# Patient Record
Sex: FEMALE | Race: BLACK OR AFRICAN AMERICAN | NOT HISPANIC OR LATINO
[De-identification: names, ages, dates, MRNs, and addresses within clinical notes are randomized per-mention and may not be internally consistent; named-entity substitution may affect disease eponyms.]

---

## 2017-05-15 ENCOUNTER — APPOINTMENT (OUTPATIENT)
Dept: HOME HEALTH SERVICES | Facility: HOME HEALTH | Age: 82
End: 2017-05-15

## 2017-05-19 ENCOUNTER — APPOINTMENT (OUTPATIENT)
Dept: HOME HEALTH SERVICES | Facility: HOME HEALTH | Age: 82
End: 2017-05-19

## 2017-05-19 VITALS
SYSTOLIC BLOOD PRESSURE: 118 MMHG | DIASTOLIC BLOOD PRESSURE: 60 MMHG | HEART RATE: 60 BPM | TEMPERATURE: 97 F | OXYGEN SATURATION: 98 % | RESPIRATION RATE: 20 BRPM

## 2017-05-19 DIAGNOSIS — Z74.01 BED CONFINEMENT STATUS: ICD-10-CM

## 2017-05-19 DIAGNOSIS — Z78.9 OTHER SPECIFIED HEALTH STATUS: ICD-10-CM

## 2017-06-30 ENCOUNTER — APPOINTMENT (OUTPATIENT)
Dept: HOME HEALTH SERVICES | Facility: HOME HEALTH | Age: 82
End: 2017-06-30

## 2017-08-10 ENCOUNTER — OTHER (OUTPATIENT)
Age: 82
End: 2017-08-10

## 2017-08-23 ENCOUNTER — APPOINTMENT (OUTPATIENT)
Dept: HOME HEALTH SERVICES | Facility: HOME HEALTH | Age: 82
End: 2017-08-23
Payer: OTHER GOVERNMENT

## 2017-08-23 VITALS
OXYGEN SATURATION: 97 % | HEART RATE: 64 BPM | DIASTOLIC BLOOD PRESSURE: 60 MMHG | TEMPERATURE: 96.5 F | SYSTOLIC BLOOD PRESSURE: 110 MMHG | RESPIRATION RATE: 20 BRPM

## 2017-08-23 PROCEDURE — 99350 HOME/RES VST EST HIGH MDM 60: CPT | Mod: 25

## 2017-08-23 PROCEDURE — 99497 ADVNCD CARE PLAN 30 MIN: CPT

## 2017-08-24 ENCOUNTER — LABORATORY RESULT (OUTPATIENT)
Age: 82
End: 2017-08-24

## 2017-08-28 LAB
25(OH)D3 SERPL-MCNC: 29.3 NG/ML
BASOPHILS # BLD AUTO: 0.06 K/UL
BASOPHILS NFR BLD AUTO: 0.9 %
CHOLEST SERPL-MCNC: 127 MG/DL
CHOLEST/HDLC SERPL: 3.2 RATIO
EOSINOPHIL # BLD AUTO: 0.24 K/UL
EOSINOPHIL NFR BLD AUTO: 3.6 %
HBA1C MFR BLD HPLC: 5.6 %
HCT VFR BLD CALC: 42.6 %
HDLC SERPL-MCNC: 40 MG/DL
HGB BLD-MCNC: 13.4 G/DL
LDLC SERPL CALC-MCNC: 60 MG/DL
LYMPHOCYTES # BLD AUTO: 1.74 K/UL
LYMPHOCYTES NFR BLD AUTO: 26.4 %
MAN DIFF?: NORMAL
MCHC RBC-ENTMCNC: 31.5 GM/DL
MCHC RBC-ENTMCNC: 33.3 PG
MCV RBC AUTO: 105.7 FL
MONOCYTES # BLD AUTO: 0.9 K/UL
MONOCYTES NFR BLD AUTO: 13.6 %
NEUTROPHILS # BLD AUTO: 3.66 K/UL
NEUTROPHILS NFR BLD AUTO: 55.5 %
PLATELET # BLD AUTO: 136 K/UL
RBC # BLD: 4.03 M/UL
RBC # FLD: 13.8 %
TRIGL SERPL-MCNC: 134 MG/DL
TSH SERPL-ACNC: 3.85 UIU/ML
VALPROATE SERPL-MCNC: 48 UG/ML
VIT B12 SERPL-MCNC: 657 PG/ML
WBC # FLD AUTO: 6.6 K/UL

## 2017-09-13 ENCOUNTER — APPOINTMENT (OUTPATIENT)
Dept: HOME HEALTH SERVICES | Facility: HOME HEALTH | Age: 82
End: 2017-09-13

## 2017-09-13 VITALS
TEMPERATURE: 97.2 F | SYSTOLIC BLOOD PRESSURE: 126 MMHG | RESPIRATION RATE: 18 BRPM | HEART RATE: 68 BPM | OXYGEN SATURATION: 96 % | DIASTOLIC BLOOD PRESSURE: 72 MMHG

## 2017-11-22 ENCOUNTER — CLINICAL ADVICE (OUTPATIENT)
Age: 82
End: 2017-11-22

## 2017-11-30 ENCOUNTER — CLINICAL ADVICE (OUTPATIENT)
Age: 82
End: 2017-11-30

## 2017-12-21 ENCOUNTER — APPOINTMENT (OUTPATIENT)
Dept: HOME HEALTH SERVICES | Facility: HOME HEALTH | Age: 82
End: 2017-12-21
Payer: OTHER GOVERNMENT

## 2017-12-21 VITALS
TEMPERATURE: 97.5 F | HEART RATE: 66 BPM | DIASTOLIC BLOOD PRESSURE: 68 MMHG | OXYGEN SATURATION: 97 % | SYSTOLIC BLOOD PRESSURE: 122 MMHG | RESPIRATION RATE: 18 BRPM

## 2017-12-21 PROCEDURE — 99350 HOME/RES VST EST HIGH MDM 60: CPT | Mod: 25

## 2017-12-21 PROCEDURE — 43760Z: CUSTOM

## 2018-01-19 ENCOUNTER — MEDICATION RENEWAL (OUTPATIENT)
Age: 83
End: 2018-01-19

## 2018-01-29 ENCOUNTER — RX RENEWAL (OUTPATIENT)
Age: 83
End: 2018-01-29

## 2018-02-26 ENCOUNTER — APPOINTMENT (OUTPATIENT)
Dept: HOME HEALTH SERVICES | Facility: HOME HEALTH | Age: 83
End: 2018-02-26
Payer: MEDICAID

## 2018-02-26 VITALS
SYSTOLIC BLOOD PRESSURE: 118 MMHG | HEART RATE: 62 BPM | OXYGEN SATURATION: 93 % | RESPIRATION RATE: 18 BRPM | TEMPERATURE: 97 F | DIASTOLIC BLOOD PRESSURE: 60 MMHG

## 2018-02-26 PROCEDURE — 99349 HOME/RES VST EST MOD MDM 40: CPT

## 2018-02-28 ENCOUNTER — LABORATORY RESULT (OUTPATIENT)
Age: 83
End: 2018-02-28

## 2018-02-28 LAB
ALBUMIN SERPL ELPH-MCNC: 3 G/DL
ALP BLD-CCNC: 61 U/L
ALT SERPL-CCNC: 12 U/L
ANION GAP SERPL CALC-SCNC: 10 MMOL/L
AST SERPL-CCNC: 16 U/L
BASOPHILS # BLD AUTO: 0.01 K/UL
BASOPHILS NFR BLD AUTO: 0.1 %
BILIRUB SERPL-MCNC: 0.4 MG/DL
BUN SERPL-MCNC: 18 MG/DL
CALCIUM SERPL-MCNC: 10.5 MG/DL
CHLORIDE SERPL-SCNC: 103 MMOL/L
CHOLEST SERPL-MCNC: 131 MG/DL
CHOLEST/HDLC SERPL: 3.5 RATIO
CO2 SERPL-SCNC: 25 MMOL/L
CREAT SERPL-MCNC: 0.5 MG/DL
EOSINOPHIL # BLD AUTO: 0.36 K/UL
EOSINOPHIL NFR BLD AUTO: 4.9 %
GLUCOSE SERPL-MCNC: 128 MG/DL
HCT VFR BLD CALC: 41.3 %
HDLC SERPL-MCNC: 37 MG/DL
HGB BLD-MCNC: 12.9 G/DL
IMM GRANULOCYTES NFR BLD AUTO: 0.3 %
LDLC SERPL CALC-MCNC: 58 MG/DL
LYMPHOCYTES # BLD AUTO: 2.23 K/UL
LYMPHOCYTES NFR BLD AUTO: 30.3 %
MAN DIFF?: NORMAL
MCHC RBC-ENTMCNC: 31.2 GM/DL
MCHC RBC-ENTMCNC: 33.3 PG
MCV RBC AUTO: 106.7 FL
MONOCYTES # BLD AUTO: 0.8 K/UL
MONOCYTES NFR BLD AUTO: 10.9 %
NEUTROPHILS # BLD AUTO: 3.95 K/UL
NEUTROPHILS NFR BLD AUTO: 53.5 %
PLATELET # BLD AUTO: 133 K/UL
POTASSIUM SERPL-SCNC: 3.8 MMOL/L
PROT SERPL-MCNC: 6.7 G/DL
RBC # BLD: 3.87 M/UL
RBC # FLD: 13.3 %
SODIUM SERPL-SCNC: 138 MMOL/L
TRIGL SERPL-MCNC: 180 MG/DL
WBC # FLD AUTO: 7.37 K/UL

## 2018-03-02 LAB
25(OH)D3 SERPL-MCNC: 24.5 NG/ML
FOLATE SERPL-MCNC: 15.4 NG/ML
HBA1C MFR BLD HPLC: 5.5 %
TSH SERPL-ACNC: 4.76 UIU/ML
VALPROATE SERPL-MCNC: 69 UG/ML
VIT B12 SERPL-MCNC: 795 PG/ML

## 2018-03-26 ENCOUNTER — APPOINTMENT (OUTPATIENT)
Dept: HOME HEALTH SERVICES | Facility: HOME HEALTH | Age: 83
End: 2018-03-26

## 2018-03-26 VITALS
HEART RATE: 68 BPM | SYSTOLIC BLOOD PRESSURE: 114 MMHG | TEMPERATURE: 97.6 F | RESPIRATION RATE: 18 BRPM | DIASTOLIC BLOOD PRESSURE: 62 MMHG | OXYGEN SATURATION: 94 %

## 2018-04-30 ENCOUNTER — APPOINTMENT (OUTPATIENT)
Dept: HOME HEALTH SERVICES | Facility: HOME HEALTH | Age: 83
End: 2018-04-30
Payer: OTHER GOVERNMENT

## 2018-04-30 VITALS
SYSTOLIC BLOOD PRESSURE: 118 MMHG | RESPIRATION RATE: 18 BRPM | HEART RATE: 72 BPM | TEMPERATURE: 97.5 F | DIASTOLIC BLOOD PRESSURE: 70 MMHG | OXYGEN SATURATION: 96 %

## 2018-04-30 PROCEDURE — 43760Z: CUSTOM

## 2018-04-30 PROCEDURE — 99350 HOME/RES VST EST HIGH MDM 60: CPT | Mod: 25

## 2018-04-30 PROCEDURE — 99497 ADVNCD CARE PLAN 30 MIN: CPT

## 2018-06-02 ENCOUNTER — RX RENEWAL (OUTPATIENT)
Age: 83
End: 2018-06-02

## 2018-06-04 ENCOUNTER — APPOINTMENT (OUTPATIENT)
Dept: HOME HEALTH SERVICES | Facility: HOME HEALTH | Age: 83
End: 2018-06-04
Payer: OTHER GOVERNMENT

## 2018-06-04 VITALS
OXYGEN SATURATION: 96 % | SYSTOLIC BLOOD PRESSURE: 122 MMHG | RESPIRATION RATE: 16 BRPM | HEART RATE: 78 BPM | DIASTOLIC BLOOD PRESSURE: 68 MMHG | TEMPERATURE: 97.5 F

## 2018-06-04 PROCEDURE — 99349 HOME/RES VST EST MOD MDM 40: CPT

## 2018-06-19 ENCOUNTER — APPOINTMENT (OUTPATIENT)
Dept: HOME HEALTH SERVICES | Facility: HOME HEALTH | Age: 83
End: 2018-06-19

## 2018-06-20 VITALS
RESPIRATION RATE: 18 BRPM | TEMPERATURE: 98.2 F | DIASTOLIC BLOOD PRESSURE: 68 MMHG | OXYGEN SATURATION: 96 % | SYSTOLIC BLOOD PRESSURE: 118 MMHG | HEART RATE: 76 BPM

## 2018-08-02 ENCOUNTER — RX RENEWAL (OUTPATIENT)
Age: 83
End: 2018-08-02

## 2018-08-14 ENCOUNTER — APPOINTMENT (OUTPATIENT)
Dept: HOME HEALTH SERVICES | Facility: HOME HEALTH | Age: 83
End: 2018-08-14
Payer: COMMERCIAL

## 2018-08-14 VITALS
OXYGEN SATURATION: 97 % | HEART RATE: 72 BPM | RESPIRATION RATE: 18 BRPM | DIASTOLIC BLOOD PRESSURE: 60 MMHG | TEMPERATURE: 97.9 F | SYSTOLIC BLOOD PRESSURE: 110 MMHG

## 2018-08-14 DIAGNOSIS — L89.322 PRESSURE ULCER OF LEFT BUTTOCK, STAGE 2: ICD-10-CM

## 2018-08-14 PROCEDURE — 99350 HOME/RES VST EST HIGH MDM 60: CPT

## 2018-08-20 ENCOUNTER — FORM ENCOUNTER (OUTPATIENT)
Age: 83
End: 2018-08-20

## 2018-08-22 ENCOUNTER — APPOINTMENT (OUTPATIENT)
Dept: HOME HEALTH SERVICES | Facility: HOME HEALTH | Age: 83
End: 2018-08-22

## 2018-08-22 VITALS
HEART RATE: 76 BPM | OXYGEN SATURATION: 96 % | SYSTOLIC BLOOD PRESSURE: 112 MMHG | TEMPERATURE: 98.2 F | RESPIRATION RATE: 18 BRPM | DIASTOLIC BLOOD PRESSURE: 64 MMHG

## 2018-09-07 ENCOUNTER — APPOINTMENT (OUTPATIENT)
Dept: HOME HEALTH SERVICES | Facility: HOME HEALTH | Age: 83
End: 2018-09-07

## 2018-09-07 VITALS
RESPIRATION RATE: 18 BRPM | SYSTOLIC BLOOD PRESSURE: 116 MMHG | HEART RATE: 78 BPM | OXYGEN SATURATION: 98 % | DIASTOLIC BLOOD PRESSURE: 68 MMHG | TEMPERATURE: 98.8 F

## 2018-10-02 ENCOUNTER — APPOINTMENT (OUTPATIENT)
Dept: HOME HEALTH SERVICES | Facility: HOME HEALTH | Age: 83
End: 2018-10-02
Payer: COMMERCIAL

## 2018-10-02 VITALS
TEMPERATURE: 97.9 F | RESPIRATION RATE: 16 BRPM | OXYGEN SATURATION: 98 % | SYSTOLIC BLOOD PRESSURE: 118 MMHG | HEART RATE: 87 BPM | DIASTOLIC BLOOD PRESSURE: 60 MMHG

## 2018-10-02 DIAGNOSIS — L89.323 PRESSURE ULCER OF LEFT BUTTOCK, STAGE 3: ICD-10-CM

## 2018-10-02 PROCEDURE — 99350 HOME/RES VST EST HIGH MDM 60: CPT | Mod: 25

## 2018-10-02 PROCEDURE — 43760Z: CUSTOM

## 2018-11-02 ENCOUNTER — CLINICAL ADVICE (OUTPATIENT)
Age: 83
End: 2018-11-02

## 2018-11-02 ENCOUNTER — APPOINTMENT (OUTPATIENT)
Dept: HOME HEALTH SERVICES | Facility: HOME HEALTH | Age: 83
End: 2018-11-02

## 2018-11-02 VITALS
SYSTOLIC BLOOD PRESSURE: 126 MMHG | DIASTOLIC BLOOD PRESSURE: 78 MMHG | TEMPERATURE: 98.2 F | HEART RATE: 86 BPM | RESPIRATION RATE: 16 BRPM | OXYGEN SATURATION: 98 %

## 2018-11-02 RX ORDER — CEPHALEXIN 250 MG/5ML
250 FOR SUSPENSION ORAL
Qty: 1 | Refills: 0 | Status: DISCONTINUED | COMMUNITY
Start: 2018-09-04 | End: 2018-11-02

## 2018-11-28 ENCOUNTER — APPOINTMENT (OUTPATIENT)
Dept: HOME HEALTH SERVICES | Facility: HOME HEALTH | Age: 83
End: 2018-11-28
Payer: COMMERCIAL

## 2018-11-28 VITALS
TEMPERATURE: 97.7 F | DIASTOLIC BLOOD PRESSURE: 85 MMHG | RESPIRATION RATE: 14 BRPM | OXYGEN SATURATION: 97 % | HEART RATE: 65 BPM | SYSTOLIC BLOOD PRESSURE: 142 MMHG

## 2018-11-28 DIAGNOSIS — R06.82 TACHYPNEA, NOT ELSEWHERE CLASSIFIED: ICD-10-CM

## 2018-11-28 PROCEDURE — 99349 HOME/RES VST EST MOD MDM 40: CPT

## 2019-01-01 ENCOUNTER — APPOINTMENT (OUTPATIENT)
Dept: HOME HEALTH SERVICES | Facility: HOME HEALTH | Age: 84
End: 2019-01-01

## 2019-01-01 ENCOUNTER — TRANSCRIPTION ENCOUNTER (OUTPATIENT)
Age: 84
End: 2019-01-01

## 2019-01-01 ENCOUNTER — APPOINTMENT (OUTPATIENT)
Dept: HOME HEALTH SERVICES | Facility: HOME HEALTH | Age: 84
End: 2019-01-01
Payer: COMMERCIAL

## 2019-01-01 VITALS
TEMPERATURE: 97.4 F | OXYGEN SATURATION: 97 % | DIASTOLIC BLOOD PRESSURE: 80 MMHG | SYSTOLIC BLOOD PRESSURE: 110 MMHG | HEART RATE: 59 BPM | RESPIRATION RATE: 16 BRPM

## 2019-01-01 VITALS
SYSTOLIC BLOOD PRESSURE: 116 MMHG | RESPIRATION RATE: 16 BRPM | HEART RATE: 64 BPM | DIASTOLIC BLOOD PRESSURE: 62 MMHG | TEMPERATURE: 96.8 F | OXYGEN SATURATION: 98 %

## 2019-01-01 DIAGNOSIS — J84.9 INTERSTITIAL PULMONARY DISEASE, UNSPECIFIED: ICD-10-CM

## 2019-01-01 PROCEDURE — 99349 HOME/RES VST EST MOD MDM 40: CPT | Mod: 25

## 2019-01-01 PROCEDURE — 43762Z: CUSTOM

## 2019-01-01 RX ORDER — SODIUM HYPOCHLORITE 2.5 MG/ML
0.25 SOLUTION TOPICAL
Qty: 1 | Refills: 0 | Status: DISCONTINUED | COMMUNITY
Start: 2019-09-10 | End: 2019-01-01

## 2019-01-02 ENCOUNTER — APPOINTMENT (OUTPATIENT)
Dept: HOME HEALTH SERVICES | Facility: HOME HEALTH | Age: 84
End: 2019-01-02

## 2019-01-02 ENCOUNTER — CLINICAL ADVICE (OUTPATIENT)
Age: 84
End: 2019-01-02

## 2019-01-02 VITALS
TEMPERATURE: 98.2 F | SYSTOLIC BLOOD PRESSURE: 136 MMHG | RESPIRATION RATE: 16 BRPM | OXYGEN SATURATION: 96 % | DIASTOLIC BLOOD PRESSURE: 82 MMHG | HEART RATE: 62 BPM

## 2019-01-30 ENCOUNTER — APPOINTMENT (OUTPATIENT)
Dept: HOME HEALTH SERVICES | Facility: HOME HEALTH | Age: 84
End: 2019-01-30
Payer: OTHER GOVERNMENT

## 2019-01-30 VITALS
OXYGEN SATURATION: 96 % | TEMPERATURE: 97.1 F | HEART RATE: 67 BPM | RESPIRATION RATE: 14 BRPM | SYSTOLIC BLOOD PRESSURE: 116 MMHG | DIASTOLIC BLOOD PRESSURE: 58 MMHG

## 2019-01-30 DIAGNOSIS — K11.7 DISTURBANCES OF SALIVARY SECRETION: ICD-10-CM

## 2019-01-30 DIAGNOSIS — K59.00 CONSTIPATION, UNSPECIFIED: ICD-10-CM

## 2019-01-30 PROCEDURE — 99350 HOME/RES VST EST HIGH MDM 60: CPT

## 2019-01-30 NOTE — PHYSICAL EXAM
[No Acute Distress] : no acute distress [Well Nourished] : well nourished [Normal Sclera/Conjunctiva] : normal sclera/conjunctiva [Normal Outer Ear/Nose] : the ears and nose were normal in appearance [Normal Oropharynx] : the oropharynx was normal [No JVD] : no jugular venous distention [Supple] : the neck was supple [No LAD] : no lymphadenopathy [No Respiratory Distress] : no respiratory distress [No Accessory Muscle Use] : no accessory muscle use [Normal Rate] : heart rate was normal  [Regular Rhythm] : with a regular rhythm [Normal Appearance] : normal in appearance [No Masses] : no palpable masses [Non Tender] : non-tender [Normal Inguinal Nodes] : no inguinal lymphadenopathy [Normal Post Cervical Nodes] : no posterior cervical lymphadenopathy [Normal Anterior Cervical Nodes] : no anterior cervical lymphadenopathy [No CVA Tenderness] : no ~M costovertebral angle tenderness [No Rash] : no rash [No Skin Lesions] : no skin lesions [de-identified] : frail appearing female, laying in bed with eyes closed [de-identified] : harsh breath sounds, slightly tachypneic [de-identified] : Peg 20Fr 7-10cc  in place [de-identified] : bedbound, upper and lower extremity contractures [de-identified] : Stage 3 decubitus ulcer of right buttock 8 cm x 5 cm.  [de-identified] : cannot obtain [de-identified] : non verbal, demented

## 2019-01-30 NOTE — REASON FOR VISIT
[Follow-Up] : a follow-up visit [Formal Caregiver] : formal caregiver [Pre-Visit Preparation] : pre-visit preparation was done [FreeTextEntry1] : Hypertension, Parkinson's Disease, Parkinson's Dementia, Glaucoma, PEG replacement

## 2019-01-30 NOTE — HISTORY OF PRESENT ILLNESS
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 Patient is non verbal, bed bound, G-tube feeding 20 Fr 10 cc G-tube, continuous feeds for 14 hours. She is on. 500 cc Nutren mixed with 500 cc of water. Free water 10 ounces twice daily with medications. Bowels are regular with occasional use of miralax. Sleeping well, no agitation. Remains non-verbal. Still getting home care for sacral wound 3 times weekly. Switching to medihoney and aquacel silver. \par \par Patient is completely dependent and does not move in bed. She is contracted in her upper and lower extremities. \par \par Family aware patient is poor prognosis. Spoke with son, Harish, during the visit.

## 2019-01-30 NOTE — COUNSELING
[Non - Smoker] : non-smoker [Medical alert] : medical alert [Vaccinations: _______] : Vaccinations: [unfilled] [Decrease hospital use] : decrease hospital use [Minimize unnecessary interventions] : minimize unnecessary interventions [Discussed disease trajectory with patient/caregiver] : discussed disease trajectory with patient/caregiver [Completed Medical Orders for Life-Sustaining Treatment] : completed medical orders for life-sustaining treatment [DNR] : Code Status: DNR [Limited] : Treatment Guidelines: Limited [Trial of Intubation] : Intubation: Trial of Intubation [[facility]: [facility]last verification date: [unfilled] [_____] : HCP: [unfilled] [FreeTextEntry3] : PEG feeding [FreeTextEntry2] : Call  for all medical concerns

## 2019-03-05 ENCOUNTER — APPOINTMENT (OUTPATIENT)
Dept: HOME HEALTH SERVICES | Facility: HOME HEALTH | Age: 84
End: 2019-03-05

## 2019-03-28 ENCOUNTER — APPOINTMENT (OUTPATIENT)
Dept: HOME HEALTH SERVICES | Facility: HOME HEALTH | Age: 84
End: 2019-03-28
Payer: OTHER GOVERNMENT

## 2019-03-28 VITALS
OXYGEN SATURATION: 99 % | DIASTOLIC BLOOD PRESSURE: 80 MMHG | TEMPERATURE: 97.7 F | SYSTOLIC BLOOD PRESSURE: 130 MMHG | HEART RATE: 76 BPM | RESPIRATION RATE: 14 BRPM

## 2019-03-28 DIAGNOSIS — L89.324 PRESSURE ULCER OF LEFT BUTTOCK, STAGE 4: ICD-10-CM

## 2019-03-28 DIAGNOSIS — K21.9 GASTRO-ESOPHAGEAL REFLUX DISEASE W/OUT ESOPHAGITIS: ICD-10-CM

## 2019-03-28 DIAGNOSIS — H40.9 UNSPECIFIED GLAUCOMA: ICD-10-CM

## 2019-03-28 DIAGNOSIS — Z71.89 OTHER SPECIFIED COUNSELING: ICD-10-CM

## 2019-03-28 PROCEDURE — 99350 HOME/RES VST EST HIGH MDM 60: CPT | Mod: 25

## 2019-03-28 PROCEDURE — 99497 ADVNCD CARE PLAN 30 MIN: CPT

## 2019-03-29 ENCOUNTER — APPOINTMENT (OUTPATIENT)
Dept: HOME HEALTH SERVICES | Facility: HOME HEALTH | Age: 84
End: 2019-03-29
Payer: OTHER GOVERNMENT

## 2019-03-29 PROBLEM — L89.324: Noted: 2018-10-02

## 2019-03-29 PROCEDURE — 99347 HOME/RES VST EST SF MDM 20: CPT | Mod: 25

## 2019-03-29 PROCEDURE — 43762Z: CUSTOM

## 2019-03-30 PROBLEM — Z71.89 ADVANCE CARE PLANNING: Status: ACTIVE | Noted: 2017-05-19

## 2019-03-30 PROBLEM — K21.9 GASTROESOPHAGEAL REFLUX DISEASE WITHOUT ESOPHAGITIS: Status: ACTIVE | Noted: 2017-05-19

## 2019-03-30 PROBLEM — H40.9 GLAUCOMA OF BOTH EYES: Status: ACTIVE | Noted: 2017-05-19

## 2019-03-30 NOTE — COUNSELING
[Non - Smoker] : non-smoker [Medical alert] : medical alert [Decrease hospital use] : decrease hospital use [Minimize unnecessary interventions] : minimize unnecessary interventions [Discussed disease trajectory with patient/caregiver] : discussed disease trajectory with patient/caregiver [Completed Medical Orders for Life-Sustaining Treatment] : completed medical orders for life-sustaining treatment [DNR] : Code Status: DNR [Limited] : Treatment Guidelines: Limited [Trial of Intubation] : Intubation: Trial of Intubation [_____] : HCP: [unfilled] [Not Indicated] : not indicated [FreeTextEntry3] : PEG feeding [FreeTextEntry2] : Call  for all medical concerns [Annual Discussion and review of: ___] : Annual discussion and review of [unfilled] [Completed DNR] : completed DNR [[facility]: _____] [facility] Completion/last verification date: [unfilled]

## 2019-03-30 NOTE — HISTORY OF PRESENT ILLNESS
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 for 14 hours with free water 10 ounces twice daily with medications.\par Moving bowels well with occasional use of miralax.\par Sleeping well through night\par Remains on  home care for (L) hip wound 3 times weekly currently treated with medihoney with   foam dressing\par Family aware patient is poor prognosis. Spoke with son, Harish, during the visit.

## 2019-03-30 NOTE — REASON FOR VISIT
[Acute] : an acute visit [Formal Caregiver] : formal caregiver [FreeTextEntry1] : for PEG tube change

## 2019-03-30 NOTE — REVIEW OF SYSTEMS
[Fever] : no fever [Chills] : no chills [Fatigue] : fatigue [Cough] : cough [Constipation] : no constipation [Incontinence] : incontinence [Negative] : Cardiovascular [FreeTextEntry7] : PEG tube stretched [de-identified] : as noted in HPI [FreeTextEntry1] : GHAZALA w/ son & aide

## 2019-03-30 NOTE — REASON FOR VISIT
[Follow-Up] : a follow-up visit [Formal Caregiver] : formal caregiver [Pre-Visit Preparation] : pre-visit preparation was done [FreeTextEntry1] : for chronic conditions

## 2019-03-30 NOTE — REVIEW OF SYSTEMS
[Fatigue] : fatigue [Cough] : cough [Incontinence] : incontinence [Negative] : Cardiovascular [Fever] : no fever [Chills] : no chills [Constipation] : no constipation [FreeTextEntry7] : PEG tube stretched [de-identified] : as noted in HPI [FreeTextEntry1] : GHAZALA w/ son & aide

## 2019-03-30 NOTE — PHYSICAL EXAM
[No Acute Distress] : no acute distress [Well Nourished] : well nourished [Normal Outer Ear/Nose] : the ears and nose were normal in appearance [Normal Oropharynx] : the oropharynx was normal [No JVD] : no jugular venous distention [Supple] : the neck was supple [No LAD] : no lymphadenopathy [No Respiratory Distress] : no respiratory distress [No Accessory Muscle Use] : no accessory muscle use [Normal Rate] : heart rate was normal  [Regular Rhythm] : with a regular rhythm [Normal Appearance] : normal in appearance [No Masses] : no palpable masses [Non Tender] : non-tender [Normal Post Cervical Nodes] : no posterior cervical lymphadenopathy [Normal Anterior Cervical Nodes] : no anterior cervical lymphadenopathy [No CVA Tenderness] : no ~M costovertebral angle tenderness [No Rash] : no rash [No Skin Lesions] : no skin lesions [Normal Sclera/Conjunctiva] : normal sclera/conjunctiva [Normal Bowel Sounds] : normal bowel sounds [Soft] : abdomen soft [Not Distended] : not distended [de-identified] : frail appearing female, contracted arms and hands; unable to make needs know [de-identified] : scattered rhonchi [de-identified] : stretched peg 20Fr 7-10cc  in place [de-identified] : bedbound, upper and lower extremity contractures [de-identified] : Stage 3 decubitus ulcer of (L) buttock with dressing in place [de-identified] : cannot obtain [de-identified] : non verbal, demented

## 2019-03-30 NOTE — PHYSICAL EXAM
[No Acute Distress] : no acute distress [Well Nourished] : well nourished [Normal Outer Ear/Nose] : the ears and nose were normal in appearance [No JVD] : no jugular venous distention [No LAD] : no lymphadenopathy [No Respiratory Distress] : no respiratory distress [No Accessory Muscle Use] : no accessory muscle use [Non Tender] : non-tender [Normal Anterior Cervical Nodes] : no anterior cervical lymphadenopathy [No CVA Tenderness] : no ~M costovertebral angle tenderness [No Rash] : no rash [No Skin Lesions] : no skin lesions [No Edema] : there was no peripheral edema [Normal Bowel Sounds] : normal bowel sounds [Soft] : abdomen soft [Not Distended] : not distended [de-identified] : frail appearing female, contracted arms and hands; unable to make needs know [de-identified] : stretched peg 20Fr 7-10cc  in place [de-identified] : bedbound, upper and lower extremity contractures [de-identified] : cannot obtain [de-identified] : non verbal, demented

## 2019-03-30 NOTE — HISTORY OF PRESENT ILLNESS
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 for 14 hours with free water 10 ounces twice daily with medications.\par

## 2019-05-15 ENCOUNTER — RX RENEWAL (OUTPATIENT)
Age: 84
End: 2019-05-15

## 2019-05-23 ENCOUNTER — RX RENEWAL (OUTPATIENT)
Age: 84
End: 2019-05-23

## 2019-05-28 ENCOUNTER — APPOINTMENT (OUTPATIENT)
Dept: HOME HEALTH SERVICES | Facility: HOME HEALTH | Age: 84
End: 2019-05-28
Payer: OTHER GOVERNMENT

## 2019-05-28 VITALS
DIASTOLIC BLOOD PRESSURE: 78 MMHG | TEMPERATURE: 96.1 F | SYSTOLIC BLOOD PRESSURE: 122 MMHG | OXYGEN SATURATION: 98 % | RESPIRATION RATE: 14 BRPM | HEART RATE: 60 BPM

## 2019-05-28 PROCEDURE — 99349 HOME/RES VST EST MOD MDM 40: CPT

## 2019-05-28 RX ORDER — COLLAGENASE SANTYL 250 [ARB'U]/G
250 OINTMENT TOPICAL DAILY
Qty: 1 | Refills: 3 | Status: DISCONTINUED | COMMUNITY
Start: 2018-07-18 | End: 2019-05-28

## 2019-05-28 NOTE — REASON FOR VISIT
[Follow-Up] : a follow-up visit [Pre-Visit Preparation] : pre-visit preparation was done [Formal Caregiver] : formal caregiver [FreeTextEntry1] : for chronic conditions

## 2019-05-28 NOTE — COUNSELING
[Non - Smoker] : non-smoker [Medical alert] : medical alert [Not Indicated] : not indicated [Minimize unnecessary interventions] : minimize unnecessary interventions [Decrease hospital use] : decrease hospital use [Discussed disease trajectory with patient/caregiver] : discussed disease trajectory with patient/caregiver [Annual Discussion and review of: ___] : Annual discussion and review of [unfilled] [Completed DNR] : completed DNR [DNR] : Code Status: DNR [Completed Medical Orders for Life-Sustaining Treatment] : completed medical orders for life-sustaining treatment [Limited] : Treatment Guidelines: Limited [Trial of Intubation] : Intubation: Trial of Intubation [[facility]: _____] [facility]/last verification date: [unfilled] [_____] : HCP: [unfilled] [FreeTextEntry2] : Call  for all medical concerns [FreeTextEntry3] : PEG feeding

## 2019-05-28 NOTE — REVIEW OF SYSTEMS
[Fatigue] : fatigue [Cough] : cough [Incontinence] : incontinence [Negative] : Cardiovascular [Fever] : no fever [Chills] : no chills [Constipation] : no constipation [FreeTextEntry7] : PEG tube stretched [de-identified] : as noted in HPI [FreeTextEntry1] : GHAZALA w/ son & aide

## 2019-05-28 NOTE — PHYSICAL EXAM
[No Acute Distress] : no acute distress [Normal Sclera/Conjunctiva] : normal sclera/conjunctiva [Normal Outer Ear/Nose] : the ears and nose were normal in appearance [Well Nourished] : well nourished [Normal Oropharynx] : the oropharynx was normal [No JVD] : no jugular venous distention [No LAD] : no lymphadenopathy [No Respiratory Distress] : no respiratory distress [Supple] : the neck was supple [No Accessory Muscle Use] : no accessory muscle use [Normal Rate] : heart rate was normal  [Regular Rhythm] : with a regular rhythm [Normal Appearance] : normal in appearance [No Masses] : no palpable masses [Non Tender] : non-tender [Normal Bowel Sounds] : normal bowel sounds [Soft] : abdomen soft [Normal Anterior Cervical Nodes] : no anterior cervical lymphadenopathy [Not Distended] : not distended [Normal Post Cervical Nodes] : no posterior cervical lymphadenopathy [No Skin Lesions] : no skin lesions [No CVA Tenderness] : no ~M costovertebral angle tenderness [No Rash] : no rash [de-identified] : frail appearing female, contracted arms and hands; unable to make needs know [de-identified] : scattered rhonchi [de-identified] : stretched peg 20Fr 7-10cc  in place [de-identified] : bedbound, upper and lower extremity contractures [de-identified] : Stage 3 decubitus ulcer of (L) buttock with dressing in place [de-identified] : non verbal, demented [de-identified] : cannot obtain

## 2019-05-28 NOTE — HISTORY OF PRESENT ILLNESS
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 [FreeTextEntry2] : PMH:  Hypertension, Parkinson's Disease, Parkinson's Dementia, Glaucoma\par \par Pt in bed contracted, non- verbal unable to make needs know, dependent on all ADLs\par Aide in home reports pt in usual state of health; on G-Tube feeding with 500 cc Nutren mixed with 500 cc of water.for 14 hours with free water 10 ounces twice daily with medications.\par Still moving bowels well with occasional use of miralax.\par Sleeping well through night\par Remains on  home care for (L) hip wound 3 times weekly-  treated with medihoney with   foam dressing\par Family aware patient is poor prognosis. Spoke with son, Harish, during the visit.

## 2019-05-28 NOTE — LETTER HEADER
[Care Plan reviewed and provided to patient/caregiver] : Care plan reviewed and provided to patient/caregiver [Care Plan reviewed every ___ weeks] : Care plan reviewed every [unfilled] weeks [Initiation or substantial revisions made to care plan involving mod/high medical decision making for complex CCM] : Initiation or substantial revisions made to care plan involving mod/high medical decision making for complex CCM [Patient/Caregiver agrees to have other providers send summary of their care to this office] : Patient/caregiver agrees to have other providers send summary of their care to this office [Care Plan managed/Care coordinated by: ___] : Care plan managed/Care coordinated by: [unfilled]

## 2019-06-03 ENCOUNTER — MEDICATION RENEWAL (OUTPATIENT)
Age: 84
End: 2019-06-03

## 2019-06-19 ENCOUNTER — APPOINTMENT (OUTPATIENT)
Dept: HOME HEALTH SERVICES | Facility: HOME HEALTH | Age: 84
End: 2019-06-19

## 2019-06-19 VITALS
HEART RATE: 64 BPM | TEMPERATURE: 96.8 F | RESPIRATION RATE: 16 BRPM | OXYGEN SATURATION: 98 % | SYSTOLIC BLOOD PRESSURE: 126 MMHG | DIASTOLIC BLOOD PRESSURE: 72 MMHG

## 2019-06-24 ENCOUNTER — MEDICATION RENEWAL (OUTPATIENT)
Age: 84
End: 2019-06-24

## 2019-07-18 ENCOUNTER — LABORATORY RESULT (OUTPATIENT)
Age: 84
End: 2019-07-18

## 2019-07-18 ENCOUNTER — APPOINTMENT (OUTPATIENT)
Dept: HOME HEALTH SERVICES | Facility: HOME HEALTH | Age: 84
End: 2019-07-18
Payer: OTHER GOVERNMENT

## 2019-07-18 VITALS
DIASTOLIC BLOOD PRESSURE: 70 MMHG | TEMPERATURE: 97 F | RESPIRATION RATE: 14 BRPM | OXYGEN SATURATION: 99 % | SYSTOLIC BLOOD PRESSURE: 128 MMHG | HEART RATE: 60 BPM

## 2019-07-18 PROCEDURE — 99350 HOME/RES VST EST HIGH MDM 60: CPT

## 2019-07-19 LAB
ALBUMIN SERPL ELPH-MCNC: 3.2 G/DL
ALP BLD-CCNC: 64 U/L
ALT SERPL-CCNC: 5 U/L
ANION GAP SERPL CALC-SCNC: 10 MMOL/L
AST SERPL-CCNC: 12 U/L
BASOPHILS # BLD AUTO: 0.02 K/UL
BASOPHILS NFR BLD AUTO: 0.3 %
BILIRUB SERPL-MCNC: 0.3 MG/DL
BUN SERPL-MCNC: 17 MG/DL
CALCIUM SERPL-MCNC: 10.4 MG/DL
CHLORIDE SERPL-SCNC: 100 MMOL/L
CO2 SERPL-SCNC: 26 MMOL/L
CREAT SERPL-MCNC: 0.37 MG/DL
EOSINOPHIL # BLD AUTO: 0.25 K/UL
EOSINOPHIL NFR BLD AUTO: 4.2 %
HCT VFR BLD CALC: 41.1 %
HGB BLD-MCNC: 12.3 G/DL
IMM GRANULOCYTES NFR BLD AUTO: 0.2 %
LYMPHOCYTES # BLD AUTO: 1.88 K/UL
LYMPHOCYTES NFR BLD AUTO: 31.8 %
MAN DIFF?: NORMAL
MCHC RBC-ENTMCNC: 29.9 GM/DL
MCHC RBC-ENTMCNC: 31.9 PG
MCV RBC AUTO: 106.8 FL
MONOCYTES # BLD AUTO: 0.52 K/UL
MONOCYTES NFR BLD AUTO: 8.8 %
NEUTROPHILS # BLD AUTO: 3.24 K/UL
NEUTROPHILS NFR BLD AUTO: 54.7 %
PLATELET # BLD AUTO: 195 K/UL
POTASSIUM SERPL-SCNC: 4.3 MMOL/L
PREALB SERPL NEPH-MCNC: 18 MG/DL
PROT SERPL-MCNC: 7.8 G/DL
RBC # BLD: 3.85 M/UL
RBC # FLD: 13.6 %
SODIUM SERPL-SCNC: 136 MMOL/L
WBC # FLD AUTO: 5.92 K/UL

## 2019-07-19 NOTE — ASSESSMENT
[FreeTextEntry1] : Lana Escobar  has a stage _3 pressure ulcer with measurements of 0.8CM X 1CM X 1.8CM  TUNNEL AT 1 O'CLOCK 4\par Lana Escobar has been on regular assessment by a nurse and MD along with appropriate turning and positioning and management of moisture and incontinence for at least the past month which has included the use of a group 1 support surface which is less than 3.5 inches AND the wounds have worsened- will order a group 2 mattress\par

## 2019-07-19 NOTE — REVIEW OF SYSTEMS
[Fatigue] : fatigue [Cough] : cough [Incontinence] : incontinence [Negative] : Cardiovascular [Fever] : no fever [Chills] : no chills [Constipation] : no constipation [FreeTextEntry7] : PEG tube stretched [de-identified] : as noted in HPI [FreeTextEntry1] : GHAZALA w/ son & aide

## 2019-07-19 NOTE — HISTORY OF PRESENT ILLNESS
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 [FreeTextEntry2] : PMH:  Hypertension, Parkinson's Disease, Parkinson's Dementia, Glaucoma\par \par Pt in bed contracted, non- verbal unable to make needs know, dependent on all ADLs\par Aide reports pt in usual state of health; on G-Tube feedings with 500 cc Nutren mixed with 500 cc of water.for 14 hours with free water 10 ounces twice daily with medications- tube stretched again after being changed in March\par Moves bowels well with occasional MiraLAX.\par Sleeping well through night\par Remains on  home care for (L) hip wound 3 times weekly-  treated Hydrofera blue & cover with Tegaderm & reinforced with Medipore 2x/week\par Family aware patient is poor prognosis.

## 2019-07-19 NOTE — PHYSICAL EXAM
[No Acute Distress] : no acute distress [Well Nourished] : well nourished [Normal Sclera/Conjunctiva] : normal sclera/conjunctiva [Normal Outer Ear/Nose] : the ears and nose were normal in appearance [Normal Oropharynx] : the oropharynx was normal [No JVD] : no jugular venous distention [Supple] : the neck was supple [No LAD] : no lymphadenopathy [No Respiratory Distress] : no respiratory distress [No Accessory Muscle Use] : no accessory muscle use [Normal Rate] : heart rate was normal  [Regular Rhythm] : with a regular rhythm [Normal Appearance] : normal in appearance [No Masses] : no palpable masses [Normal Bowel Sounds] : normal bowel sounds [Non Tender] : non-tender [Soft] : abdomen soft [Not Distended] : not distended [Normal Post Cervical Nodes] : no posterior cervical lymphadenopathy [Normal Anterior Cervical Nodes] : no anterior cervical lymphadenopathy [No CVA Tenderness] : no ~M costovertebral angle tenderness [No Rash] : no rash [No Skin Lesions] : no skin lesions [de-identified] : frail appearing female, contracted arms and hands; unable to make needs know [de-identified] : scattered rhonchi [de-identified] : stretched peg 20Fr 7-10cc  in place [de-identified] : bedbound, upper and lower extremity contractures [de-identified] : STAGE 3 PRESSURE ULCER  L ISCHIUM 0.8CM X 1CM X 1.8CM  TUNNEL AT 1 O'CLOCK 4CM [de-identified] : cannot obtain [de-identified] : non verbal, demented

## 2019-07-19 NOTE — COUNSELING
[Decrease hospital use] : decrease hospital use [Minimize unnecessary interventions] : minimize unnecessary interventions [Discussed disease trajectory with patient/caregiver] : discussed disease trajectory with patient/caregiver [Annual Discussion and review of: ___] : Annual discussion and review of [unfilled] [Completed DNR] : completed DNR [Completed Medical Orders for Life-Sustaining Treatment] : completed medical orders for life-sustaining treatment [DNR] : Code Status: DNR [Limited] : Treatment Guidelines: Limited [Trial of Intubation] : Intubation: Trial of Intubation [[facility]: _____] [facility]pletion/last verification date: [unfilled] [_____] : HCP: [unfilled] [Non - Smoker] : non-smoker [Medical alert] : medical alert [Not Indicated] : not indicated [FreeTextEntry3] : PEG feeding [FreeTextEntry2] : Call  for all medical concerns

## 2019-07-30 ENCOUNTER — TRANSCRIPTION ENCOUNTER (OUTPATIENT)
Age: 84
End: 2019-07-30

## 2019-07-31 ENCOUNTER — APPOINTMENT (OUTPATIENT)
Dept: HOME HEALTH SERVICES | Facility: HOME HEALTH | Age: 84
End: 2019-07-31
Payer: COMMERCIAL

## 2019-07-31 PROCEDURE — 99347 HOME/RES VST EST SF MDM 20: CPT | Mod: 25

## 2019-07-31 PROCEDURE — 43762Z: CUSTOM

## 2019-08-02 NOTE — HISTORY OF PRESENT ILLNESS
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

## 2019-08-02 NOTE — REVIEW OF SYSTEMS
[Fatigue] : fatigue [Cough] : cough [Incontinence] : incontinence [Negative] : Cardiovascular [Fever] : no fever [Constipation] : no constipation [Chills] : no chills [FreeTextEntry7] : PEG tube stretched [de-identified] : as noted in HPI [FreeTextEntry1] : GHAZALA w/ son & aide

## 2019-08-02 NOTE — PHYSICAL EXAM
[No Acute Distress] : no acute distress [Well Nourished] : well nourished [No JVD] : no jugular venous distention [Normal Outer Ear/Nose] : the ears and nose were normal in appearance [No LAD] : no lymphadenopathy [No Respiratory Distress] : no respiratory distress [No Accessory Muscle Use] : no accessory muscle use [No Edema] : there was no peripheral edema [Non Tender] : non-tender [Normal Bowel Sounds] : normal bowel sounds [Soft] : abdomen soft [Not Distended] : not distended [Normal Anterior Cervical Nodes] : no anterior cervical lymphadenopathy [No CVA Tenderness] : no ~M costovertebral angle tenderness [No Rash] : no rash [No Skin Lesions] : no skin lesions [de-identified] : frail appearing female, contracted arms and hands; unable to make needs know [de-identified] : stretched peg 20Fr 7-10cc  in place [de-identified] : bedbound, upper and lower extremity contractures [de-identified] : cannot obtain [de-identified] : non verbal, demented

## 2019-08-27 ENCOUNTER — RX RENEWAL (OUTPATIENT)
Age: 84
End: 2019-08-27

## 2019-08-29 ENCOUNTER — APPOINTMENT (OUTPATIENT)
Dept: HOME HEALTH SERVICES | Facility: HOME HEALTH | Age: 84
End: 2019-08-29

## 2019-09-10 ENCOUNTER — TRANSCRIPTION ENCOUNTER (OUTPATIENT)
Age: 84
End: 2019-09-10

## 2019-09-11 ENCOUNTER — OTHER (OUTPATIENT)
Age: 84
End: 2019-09-11

## 2019-09-17 ENCOUNTER — APPOINTMENT (OUTPATIENT)
Dept: HOME HEALTH SERVICES | Facility: HOME HEALTH | Age: 84
End: 2019-09-17
Payer: COMMERCIAL

## 2019-09-17 VITALS — TEMPERATURE: 97.9 F | OXYGEN SATURATION: 96 % | RESPIRATION RATE: 14 BRPM | HEART RATE: 62 BPM

## 2019-09-17 DIAGNOSIS — Z98.890 OTHER SPECIFIED POSTPROCEDURAL STATES: ICD-10-CM

## 2019-09-17 DIAGNOSIS — T14.8XXA OTHER INJURY OF UNSPECIFIED BODY REGION, INITIAL ENCOUNTER: ICD-10-CM

## 2019-09-17 PROCEDURE — 99349 HOME/RES VST EST MOD MDM 40: CPT

## 2019-09-17 RX ORDER — LEVOFLOXACIN 750 MG/1
750 TABLET, FILM COATED ORAL DAILY
Qty: 7 | Refills: 0 | Status: DISCONTINUED | COMMUNITY
Start: 2019-09-10 | End: 2019-09-17

## 2019-09-17 NOTE — PHYSICAL EXAM
Subjective:       Patient ID: Octavia Arrington is a 60 y.o. female.    Reason for Consult: Concussion      Interval History:  Octavia Arrington is here for follow up. Their condition has somewhat improved.  The patient notes that she feels at least 75% back to her normal baseline.  She notes she is still having headaches at least twice a week with pressure when she wakes up on the top of her head.  She notes that she is also having problems with her cognition occasionally.  She notes that she has started going back to work and is working full 8 hr days.  She notes she does have some difficulty when she strains to  to [employer] boxes and this makes her head hurt.  She notes that she is noticing her visual symptoms more and having transient diplopia during her work day.  She notes that these headaches do not seem to feel like the migraines that she used to have.  She does have a history of migraines but would only get 3 or 4 headaches per year.  Now she is having at least 2 per week which feel different than the other ones.      Objective:     Vitals:    08/09/19 1237   BP: (!) 159/98   Pulse: 86     Tenderness to palpation of bilateral cervical paraspinal musculature.  Tinel sign positive bilateral greater and lesser occipital nerve.  Point of convergence is abnormal, 25 cm.  Laina is abnormal, 6/8/7, indicating vestibulopathy, however this is better than her last visit.  Focused examination was undertaken today. Over 50% of face to face time of 25 minute visit time was in giving guidance, counseling and discussing treatment options.    Assessment/Plan:     Problem List Items Addressed This Visit        ENT    Vestibulopathy      Other Visit Diagnoses     Concussion without loss of consciousness, subsequent encounter    -  Primary    Relevant Orders    Ambulatory Referral to Ophthalmology    Post-concussion syndrome        Relevant Orders    Ambulatory Referral to Ophthalmology    Convergence insufficiency or palsy in  binocular eye movement        Relevant Orders    Ambulatory Referral to Ophthalmology    Cervicogenic headache        Bilateral occipital neuralgia        Chronic post-traumatic headache, not intractable        Relevant Medications    topiramate (TOPAMAX) 25 MG tablet    Whiplash injury to neck, subsequent encounter        Migraine without aura and without status migrainosus, not intractable        Relevant Medications    topiramate (TOPAMAX) 25 MG tablet        60-year-old female presents for evaluation of concussion sustained on 03/14/2019 when she hit her head on a shelf at work.  At this time the patient feels 75% back to her baseline but is still dealing with cognitive issues, visual issues, dizziness and neck strain as well as headaches.  For her headaches I will start her on a preventive agent , Topamax 25 mg p.o. q.h.s..  With regards to her visual blurring, I will refer her to a specialist in convergence insufficiency here at [facility].  I will encourage her to continue with her occupational therapy and her physical therapy.  I will see her back in approximately 2 months time.  I will send her with a letter for restrictions at work to not have her left anything more than 25 lb.  She has not yet reached maximal medical improvement.  I will follow up with them in 2 month(s).  The patient verbalizes understanding and agreement with the treatment plan. I have discussed risks, benefits and alternatives to the treatment plan. Questions were sought and answered to her stated verbal satisfaction.        Joce Cavazos MD    This note is dictated on M*Modal Fluency Direct word recognition program. There are word recognition mistakes that are occasionally missed on review.         [No Edema] : there was no peripheral edema [No Acute Distress] : no acute distress [Normal Outer Ear/Nose] : the ears and nose were normal in appearance [Normal Sclera/Conjunctiva] : normal sclera/conjunctiva [Well Nourished] : well nourished [Normal Oropharynx] : the oropharynx was normal [No JVD] : no jugular venous distention [Supple] : the neck was supple [No LAD] : no lymphadenopathy [No Respiratory Distress] : no respiratory distress [No Accessory Muscle Use] : no accessory muscle use [Normal Rate] : heart rate was normal  [Regular Rhythm] : with a regular rhythm [Normal Appearance] : normal in appearance [No Masses] : no palpable masses [Non Tender] : non-tender [Normal Bowel Sounds] : normal bowel sounds [Soft] : abdomen soft [Not Distended] : not distended [Normal Post Cervical Nodes] : no posterior cervical lymphadenopathy [Normal Anterior Cervical Nodes] : no anterior cervical lymphadenopathy [No CVA Tenderness] : no ~M costovertebral angle tenderness [No Rash] : no rash [No Skin Lesions] : no skin lesions [de-identified] : frail female, contracted arms and hands; unable to make needs know [de-identified] : scattered rhonchi [de-identified] : stretched peg 20Fr 7-10cc  in place [de-identified] : bedbound, upper and lower extremity contractures [de-identified] : STAGE 3 PRESSURE ULCER  L ISCHIUM 0.8CM X 1CM X 1.8CM  TUNNEL AT 1 O'CLOCK 4CM [de-identified] : cannot obtain [de-identified] : non verbal, demented

## 2019-09-17 NOTE — COUNSELING
[Decrease hospital use] : decrease hospital use [Discussed disease trajectory with patient/caregiver] : discussed disease trajectory with patient/caregiver [Minimize unnecessary interventions] : minimize unnecessary interventions [Annual Discussion and review of: ___] : Annual discussion and review of [unfilled] [Completed DNR] : completed DNR [DNR] : Code Status: DNR [Completed Medical Orders for Life-Sustaining Treatment] : completed medical orders for life-sustaining treatment [Trial of Intubation] : Intubation: Trial of Intubation [Limited] : Treatment Guidelines: Limited [_____] : HCP: [unfilled] [[facility]: _____[facility]/last verification date: [unfilled] [Underweight - ( BMI  <18.5 )] : underweight - ( BMI  <18.5 ) [Medical/Nutritional supplementation as prescribed] : medical/nutritional supplementation as prescribed [] : diabetic screening

## 2019-09-17 NOTE — REVIEW OF SYSTEMS
[Fatigue] : fatigue [Cough] : cough [Incontinence] : incontinence [Negative] : ENT [Fever] : no fever [Chills] : no chills [Constipation] : no constipation [FreeTextEntry7] : PEG tube stretched [de-identified] : as noted in HPI [FreeTextEntry1] : GHAZALA w/ son & aide

## 2019-09-17 NOTE — REASON FOR VISIT
[Acute] : an acute visit [Follow-Up] : a follow-up visit [Formal Caregiver] : formal caregiver [Pre-Visit Preparation] : pre-visit preparation was done [FreeTextEntry1] : for chronic conditions

## 2019-09-17 NOTE — HISTORY OF PRESENT ILLNESS
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 \par \par \par Dementia: as above; on G-Tube feedings with 500 cc Nutren mixed with 500 cc of water.for 14 hours with free water 10 ounces twice daily with medications- tube stretched again after being changed in July\par \par Parkinson's Disease: off medications\par \par Seizure Disorder: no recent episodes; on valproic acid\par \par HTN: BP controlled with lisinopril, metoprolol

## 2019-12-30 PROBLEM — J84.9 INTERSTITIAL PNEUMONIA: Status: RESOLVED | Noted: 2019-09-10 | Resolved: 2019-01-01

## 2019-12-30 NOTE — HISTORY OF PRESENT ILLNESS
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 [FreeTextEntry2] : PMH:  Hypertension, Parkinson's Disease, Parkinson's Dementia, Glaucoma, Seizure Disorder\par \par Interval events:  peg tube stretched again \par \par Pt in bed contracted, non- verbal unable to make needs know, and is dependent on all ADLs; aide in home reports pt "the same"   \par Has episodes of constipation- family uses MiraLAX as needed\par Sleeps well through night\par Still on  home care for (L) hip wound 3 times weekly- being treated wit  hydrofera blue packing and DSD\par \par \par Dementia: as above; on G-Tube feedings with 500 cc Nutren mixed with 500 cc of water.for 14 hours with free water 10 ounces twice daily with medications- tube stretched again after being changed in July\par \par Parkinson's Disease: off medications\par \par Seizure Disorder: no recent episodes; on valproic acid\par \par HTN: BP controlled with lisinopril, metoprolol

## 2019-12-30 NOTE — PHYSICAL EXAM
[No Acute Distress] : no acute distress [Well Nourished] : well nourished [Normal Outer Ear/Nose] : the ears and nose were normal in appearance [Normal Sclera/Conjunctiva] : normal sclera/conjunctiva [Normal Oropharynx] : the oropharynx was normal [No JVD] : no jugular venous distention [Supple] : the neck was supple [No LAD] : no lymphadenopathy [No Respiratory Distress] : no respiratory distress [No Accessory Muscle Use] : no accessory muscle use [Normal Rate] : heart rate was normal  [Regular Rhythm] : with a regular rhythm [No Edema] : there was no peripheral edema [Normal Appearance] : normal in appearance [No Masses] : no palpable masses [Non Tender] : non-tender [Normal Bowel Sounds] : normal bowel sounds [Soft] : abdomen soft [Not Distended] : not distended [Normal Post Cervical Nodes] : no posterior cervical lymphadenopathy [No CVA Tenderness] : no ~M costovertebral angle tenderness [Normal Anterior Cervical Nodes] : no anterior cervical lymphadenopathy [No Rash] : no rash [No Skin Lesions] : no skin lesions [de-identified] : frail female, contracted arms and hands; unable to make needs know [de-identified] : normal scattered rhonchi [de-identified] : stretched 20Fr Gtube in place [de-identified] : bedbound, upper and lower extremity contractures [de-identified] : STAGE 3 PRESSURE ULCER  L ISCHIUM 1CM X 1CM X 1CM  [de-identified] : unable to assess secondary to dementia [de-identified] : non verbal, demented

## 2019-12-30 NOTE — CHRONIC CARE ASSESSMENT
[PPS Score: ____] : Palliative Performance Scale (PPS) Score: [unfilled] [FAST Score: ____] : Functional Assessment Scale (FAST) Score: [unfilled] marco

## 2019-12-30 NOTE — REVIEW OF SYSTEMS
[Fatigue] : fatigue [Incontinence] : incontinence [Negative] : Cardiovascular [Fever] : no fever [Chills] : no chills [Cough] : no cough [FreeTextEntry7] : PEG tube stretched [Constipation] : no constipation [de-identified] : as noted in HPI [FreeTextEntry1] : GHAZALA w/ son & aide

## 2019-12-30 NOTE — COUNSELING
[Medical/Nutritional supplementation as prescribed] : medical/nutritional supplementation as prescribed [Underweight - ( BMI  <18.5 )] : underweight - ( BMI  <18.5 ) [] : diabetic screening [Decrease hospital use] : decrease hospital use [Minimize unnecessary interventions] : minimize unnecessary interventions [Discussed disease trajectory with patient/caregiver] : discussed disease trajectory with patient/caregiver [Annual Discussion and review of: ___] : Annual discussion and review of [unfilled] [Completed DNR] : completed DNR [Completed Medical Orders for Life-Sustaining Treatment] : completed medical orders for life-sustaining treatment [Limited] : Treatment Guidelines: Limited [DNR] : Code Status: DNR [Trial of Intubation] : Intubation: Trial of Intubation [[facility] Date: _____] [facility] Completion/last verification date: [unfilled] [_____] : HCP: [unfilled]

## 2020-01-01 ENCOUNTER — APPOINTMENT (OUTPATIENT)
Dept: HOME HEALTH SERVICES | Facility: HOME HEALTH | Age: 85
End: 2020-01-01

## 2020-01-01 ENCOUNTER — TRANSCRIPTION ENCOUNTER (OUTPATIENT)
Age: 85
End: 2020-01-01

## 2020-01-01 ENCOUNTER — RX RENEWAL (OUTPATIENT)
Age: 85
End: 2020-01-01

## 2020-01-01 ENCOUNTER — APPOINTMENT (OUTPATIENT)
Dept: HOME HEALTH SERVICES | Facility: HOME HEALTH | Age: 85
End: 2020-01-01
Payer: COMMERCIAL

## 2020-01-01 ENCOUNTER — INPATIENT (INPATIENT)
Facility: HOSPITAL | Age: 85
LOS: 3 days | End: 2020-09-21
Attending: GENERAL ACUTE CARE HOSPITAL | Admitting: GENERAL ACUTE CARE HOSPITAL
Payer: COMMERCIAL

## 2020-01-01 ENCOUNTER — RX CHANGE (OUTPATIENT)
Age: 85
End: 2020-01-01

## 2020-01-01 ENCOUNTER — RECORD ABSTRACTING (OUTPATIENT)
Age: 85
End: 2020-01-01

## 2020-01-01 VITALS
HEART RATE: 74 BPM | WEIGHT: 153.88 LBS | SYSTOLIC BLOOD PRESSURE: 63 MMHG | DIASTOLIC BLOOD PRESSURE: 29 MMHG | OXYGEN SATURATION: 100 % | RESPIRATION RATE: 10 BRPM | TEMPERATURE: 98 F

## 2020-01-01 VITALS
SYSTOLIC BLOOD PRESSURE: 130 MMHG | DIASTOLIC BLOOD PRESSURE: 80 MMHG | RESPIRATION RATE: 16 BRPM | HEART RATE: 57 BPM | OXYGEN SATURATION: 97 % | TEMPERATURE: 97.6 F

## 2020-01-01 VITALS
HEART RATE: 56 BPM | DIASTOLIC BLOOD PRESSURE: 95 MMHG | TEMPERATURE: 92 F | HEIGHT: 66 IN | WEIGHT: 175.05 LBS | OXYGEN SATURATION: 99 % | RESPIRATION RATE: 17 BRPM | SYSTOLIC BLOOD PRESSURE: 148 MMHG

## 2020-01-01 DIAGNOSIS — R53.2 FUNCTIONAL QUADRIPLEGIA: ICD-10-CM

## 2020-01-01 DIAGNOSIS — Z51.5 ENCOUNTER FOR PALLIATIVE CARE: ICD-10-CM

## 2020-01-01 DIAGNOSIS — J96.02 ACUTE RESPIRATORY FAILURE WITH HYPERCAPNIA: ICD-10-CM

## 2020-01-01 DIAGNOSIS — I10 ESSENTIAL (PRIMARY) HYPERTENSION: ICD-10-CM

## 2020-01-01 DIAGNOSIS — H40.9 UNSPECIFIED GLAUCOMA: ICD-10-CM

## 2020-01-01 DIAGNOSIS — E43 UNSPECIFIED SEVERE PROTEIN-CALORIE MALNUTRITION: ICD-10-CM

## 2020-01-01 DIAGNOSIS — Z74.01 BED CONFINEMENT STATUS: ICD-10-CM

## 2020-01-01 DIAGNOSIS — L89.154 PRESSURE ULCER OF SACRAL REGION, STAGE 4: ICD-10-CM

## 2020-01-01 DIAGNOSIS — Z66 DO NOT RESUSCITATE: ICD-10-CM

## 2020-01-01 DIAGNOSIS — Z29.9 ENCOUNTER FOR PROPHYLACTIC MEASURES, UNSPECIFIED: ICD-10-CM

## 2020-01-01 DIAGNOSIS — F02.80 PARKINSON'S DISEASE: ICD-10-CM

## 2020-01-01 DIAGNOSIS — J96.01 ACUTE RESPIRATORY FAILURE WITH HYPOXIA: ICD-10-CM

## 2020-01-01 DIAGNOSIS — E11.9 TYPE 2 DIABETES MELLITUS WITHOUT COMPLICATIONS: ICD-10-CM

## 2020-01-01 DIAGNOSIS — G40.909 EPILEPSY, UNSPECIFIED, NOT INTRACTABLE, W/OUT STATUS EPILEPTICUS: ICD-10-CM

## 2020-01-01 DIAGNOSIS — R09.89 OTHER SPECIFIED SYMPTOMS AND SIGNS INVOLVING THE CIRCULATORY AND RESPIRATORY SYSTEMS: ICD-10-CM

## 2020-01-01 DIAGNOSIS — N30.00 ACUTE CYSTITIS WITHOUT HEMATURIA: ICD-10-CM

## 2020-01-01 DIAGNOSIS — G20 PARKINSON'S DISEASE: ICD-10-CM

## 2020-01-01 DIAGNOSIS — J18.1 LOBAR PNEUMONIA, UNSPECIFIED ORGANISM: ICD-10-CM

## 2020-01-01 DIAGNOSIS — A41.9 SEPSIS, UNSPECIFIED ORGANISM: ICD-10-CM

## 2020-01-01 DIAGNOSIS — L89.90 PRESSURE ULCER OF UNSPECIFIED SITE, UNSPECIFIED STAGE: ICD-10-CM

## 2020-01-01 DIAGNOSIS — R06.02 SHORTNESS OF BREATH: ICD-10-CM

## 2020-01-01 DIAGNOSIS — R06.00 DYSPNEA, UNSPECIFIED: ICD-10-CM

## 2020-01-01 DIAGNOSIS — Z71.89 OTHER SPECIFIED COUNSELING: ICD-10-CM

## 2020-01-01 DIAGNOSIS — J18.9 PNEUMONIA, UNSPECIFIED ORGANISM: ICD-10-CM

## 2020-01-01 DIAGNOSIS — L98.412: ICD-10-CM

## 2020-01-01 DIAGNOSIS — Z93.1 GASTROSTOMY STATUS: ICD-10-CM

## 2020-01-01 DIAGNOSIS — Z78.9 OTHER SPECIFIED HEALTH STATUS: ICD-10-CM

## 2020-01-01 DIAGNOSIS — J44.0 CHRONIC OBSTRUCTIVE PULMONARY DISEASE WITH (ACUTE) LOWER RESPIRATORY INFECTION: ICD-10-CM

## 2020-01-01 DIAGNOSIS — R06.03 ACUTE RESPIRATORY DISTRESS: ICD-10-CM

## 2020-01-01 DIAGNOSIS — R05 COUGH: ICD-10-CM

## 2020-01-01 DIAGNOSIS — K21.9 GASTRO-ESOPHAGEAL REFLUX DISEASE WITHOUT ESOPHAGITIS: ICD-10-CM

## 2020-01-01 DIAGNOSIS — K94.23 GASTROSTOMY MALFUNCTION: ICD-10-CM

## 2020-01-01 LAB
-  AMIKACIN: SIGNIFICANT CHANGE UP
-  AMOXICILLIN/CLAVULANIC ACID: SIGNIFICANT CHANGE UP
-  AMPICILLIN/SULBACTAM: SIGNIFICANT CHANGE UP
-  AMPICILLIN: SIGNIFICANT CHANGE UP
-  AZTREONAM: SIGNIFICANT CHANGE UP
-  CEFAZOLIN: SIGNIFICANT CHANGE UP
-  CEFEPIME: SIGNIFICANT CHANGE UP
-  CEFOXITIN: SIGNIFICANT CHANGE UP
-  CEFTRIAXONE: SIGNIFICANT CHANGE UP
-  CIPROFLOXACIN: SIGNIFICANT CHANGE UP
-  ERTAPENEM: SIGNIFICANT CHANGE UP
-  GENTAMICIN: SIGNIFICANT CHANGE UP
-  IMIPENEM: SIGNIFICANT CHANGE UP
-  LEVOFLOXACIN: SIGNIFICANT CHANGE UP
-  MEROPENEM: SIGNIFICANT CHANGE UP
-  NITROFURANTOIN: SIGNIFICANT CHANGE UP
-  PIPERACILLIN/TAZOBACTAM: SIGNIFICANT CHANGE UP
-  TIGECYCLINE: SIGNIFICANT CHANGE UP
-  TOBRAMYCIN: SIGNIFICANT CHANGE UP
-  TRIMETHOPRIM/SULFAMETHOXAZOLE: SIGNIFICANT CHANGE UP
ALBUMIN SERPL ELPH-MCNC: 1.2 G/DL — LOW (ref 3.3–5)
ALP SERPL-CCNC: 84 U/L — SIGNIFICANT CHANGE UP (ref 40–120)
ALT FLD-CCNC: 12 U/L — SIGNIFICANT CHANGE UP (ref 12–78)
ANION GAP SERPL CALC-SCNC: 5 MMOL/L — SIGNIFICANT CHANGE UP (ref 5–17)
AST SERPL-CCNC: 19 U/L — SIGNIFICANT CHANGE UP (ref 15–37)
BASE EXCESS BLDA CALC-SCNC: -0.8 MMOL/L — SIGNIFICANT CHANGE UP (ref -2–2)
BILIRUB SERPL-MCNC: 0.4 MG/DL — SIGNIFICANT CHANGE UP (ref 0.2–1.2)
BLOOD GAS COMMENTS: SIGNIFICANT CHANGE UP
BLOOD GAS SOURCE: SIGNIFICANT CHANGE UP
BUN SERPL-MCNC: 43 MG/DL — HIGH (ref 7–23)
CALCIUM SERPL-MCNC: 8.8 MG/DL — SIGNIFICANT CHANGE UP (ref 8.5–10.1)
CHLORIDE SERPL-SCNC: 99 MMOL/L — SIGNIFICANT CHANGE UP (ref 96–108)
CO2 SERPL-SCNC: 28 MMOL/L — SIGNIFICANT CHANGE UP (ref 22–31)
CREAT SERPL-MCNC: 0.82 MG/DL — SIGNIFICANT CHANGE UP (ref 0.5–1.3)
CULTURE RESULTS: SIGNIFICANT CHANGE UP
GLUCOSE BLDC GLUCOMTR-MCNC: 113 MG/DL — HIGH (ref 70–99)
GLUCOSE BLDC GLUCOMTR-MCNC: 123 MG/DL — HIGH (ref 70–99)
GLUCOSE BLDC GLUCOMTR-MCNC: 142 MG/DL — HIGH (ref 70–99)
GLUCOSE BLDC GLUCOMTR-MCNC: 56 MG/DL — LOW (ref 70–99)
GLUCOSE BLDC GLUCOMTR-MCNC: 58 MG/DL — LOW (ref 70–99)
GLUCOSE BLDC GLUCOMTR-MCNC: 76 MG/DL — SIGNIFICANT CHANGE UP (ref 70–99)
GLUCOSE BLDC GLUCOMTR-MCNC: 77 MG/DL — SIGNIFICANT CHANGE UP (ref 70–99)
GLUCOSE BLDC GLUCOMTR-MCNC: 93 MG/DL — SIGNIFICANT CHANGE UP (ref 70–99)
GLUCOSE BLDC GLUCOMTR-MCNC: 94 MG/DL — SIGNIFICANT CHANGE UP (ref 70–99)
GLUCOSE BLDC GLUCOMTR-MCNC: 95 MG/DL — SIGNIFICANT CHANGE UP (ref 70–99)
GLUCOSE BLDC GLUCOMTR-MCNC: 96 MG/DL — SIGNIFICANT CHANGE UP (ref 70–99)
GLUCOSE SERPL-MCNC: 92 MG/DL — SIGNIFICANT CHANGE UP (ref 70–99)
HCO3 BLDA-SCNC: 25 MMOL/L — SIGNIFICANT CHANGE UP (ref 21–29)
HCT VFR BLD CALC: 29.5 % — LOW (ref 34.5–45)
HGB BLD-MCNC: 9.4 G/DL — LOW (ref 11.5–15.5)
HOROWITZ INDEX BLDA+IHG-RTO: 50 — SIGNIFICANT CHANGE UP
INR BLD: 1.15 RATIO — SIGNIFICANT CHANGE UP (ref 0.88–1.16)
LACTATE SERPL-SCNC: 1.9 MMOL/L — SIGNIFICANT CHANGE UP (ref 0.7–2)
LEGIONELLA AG UR QL: NEGATIVE — SIGNIFICANT CHANGE UP
MCHC RBC-ENTMCNC: 31.3 PG — SIGNIFICANT CHANGE UP (ref 27–34)
MCHC RBC-ENTMCNC: 31.9 GM/DL — LOW (ref 32–36)
MCV RBC AUTO: 98.3 FL — SIGNIFICANT CHANGE UP (ref 80–100)
METHOD TYPE: SIGNIFICANT CHANGE UP
MRSA PCR RESULT.: SIGNIFICANT CHANGE UP
NRBC # BLD: 1 /100 WBCS — HIGH (ref 0–0)
NT-PROBNP SERPL-SCNC: 1758 PG/ML — HIGH (ref 0–450)
ORGANISM # SPEC MICROSCOPIC CNT: SIGNIFICANT CHANGE UP
ORGANISM # SPEC MICROSCOPIC CNT: SIGNIFICANT CHANGE UP
PCO2 BLDA: 50 MMHG — HIGH (ref 32–46)
PH BLD: 7.32 — LOW (ref 7.35–7.45)
PHOSPHATE SERPL-MCNC: 3.4 MG/DL — SIGNIFICANT CHANGE UP (ref 2.5–4.5)
PLATELET # BLD AUTO: 101 K/UL — LOW (ref 150–400)
PO2 BLDA: 111 MMHG — HIGH (ref 74–108)
POTASSIUM SERPL-MCNC: 4.6 MMOL/L — SIGNIFICANT CHANGE UP (ref 3.5–5.3)
POTASSIUM SERPL-SCNC: 4.6 MMOL/L — SIGNIFICANT CHANGE UP (ref 3.5–5.3)
PROCALCITONIN SERPL-MCNC: 0.47 NG/ML — HIGH (ref 0.02–0.1)
PROT SERPL-MCNC: 7.4 GM/DL — SIGNIFICANT CHANGE UP (ref 6–8.3)
PROTHROM AB SERPL-ACNC: 13.3 SEC — SIGNIFICANT CHANGE UP (ref 10.6–13.6)
RBC # BLD: 3 M/UL — LOW (ref 3.8–5.2)
RBC # FLD: 17.2 % — HIGH (ref 10.3–14.5)
S AUREUS DNA NOSE QL NAA+PROBE: DETECTED
S PNEUM AG UR QL: NEGATIVE — SIGNIFICANT CHANGE UP
SAO2 % BLDA: 98 % — HIGH (ref 92–96)
SARS-COV-2 IGG SERPL QL IA: NEGATIVE — SIGNIFICANT CHANGE UP
SARS-COV-2 IGM SERPL IA-ACNC: 0.09 INDEX — SIGNIFICANT CHANGE UP
SARS-COV-2 RNA SPEC QL NAA+PROBE: SIGNIFICANT CHANGE UP
SODIUM SERPL-SCNC: 132 MMOL/L — LOW (ref 135–145)
SPECIMEN SOURCE: SIGNIFICANT CHANGE UP
TROPONIN I SERPL-MCNC: 0.02 NG/ML — SIGNIFICANT CHANGE UP (ref 0.01–0.04)
TROPONIN I SERPL-MCNC: <.015 NG/ML — SIGNIFICANT CHANGE UP (ref 0.01–0.04)
VANCOMYCIN FLD-MCNC: 47.8 UG/ML
WBC # BLD: 6.35 K/UL — SIGNIFICANT CHANGE UP (ref 3.8–10.5)
WBC # FLD AUTO: 6.35 K/UL — SIGNIFICANT CHANGE UP (ref 3.8–10.5)

## 2020-01-01 PROCEDURE — 99222 1ST HOSP IP/OBS MODERATE 55: CPT

## 2020-01-01 PROCEDURE — 99347 HOME/RES VST EST SF MDM 20: CPT | Mod: 25

## 2020-01-01 PROCEDURE — 99498 ADVNCD CARE PLAN ADDL 30 MIN: CPT | Mod: 25

## 2020-01-01 PROCEDURE — 99223 1ST HOSP IP/OBS HIGH 75: CPT

## 2020-01-01 PROCEDURE — 99223 1ST HOSP IP/OBS HIGH 75: CPT | Mod: AI

## 2020-01-01 PROCEDURE — 99232 SBSQ HOSP IP/OBS MODERATE 35: CPT

## 2020-01-01 PROCEDURE — 99233 SBSQ HOSP IP/OBS HIGH 50: CPT

## 2020-01-01 PROCEDURE — 99348 HOME/RES VST EST LOW MDM 30: CPT | Mod: 25

## 2020-01-01 PROCEDURE — 99285 EMERGENCY DEPT VISIT HI MDM: CPT | Mod: CS

## 2020-01-01 PROCEDURE — 43762Z: CUSTOM

## 2020-01-01 PROCEDURE — 93010 ELECTROCARDIOGRAM REPORT: CPT

## 2020-01-01 PROCEDURE — 71045 X-RAY EXAM CHEST 1 VIEW: CPT | Mod: 26

## 2020-01-01 PROCEDURE — 99497 ADVNCD CARE PLAN 30 MIN: CPT | Mod: 25

## 2020-01-01 PROCEDURE — 99239 HOSP IP/OBS DSCHRG MGMT >30: CPT

## 2020-01-01 PROCEDURE — 99497 ADVNCD CARE PLAN 30 MIN: CPT

## 2020-01-01 PROCEDURE — 99349 HOME/RES VST EST MOD MDM 40: CPT

## 2020-01-01 RX ORDER — LATANOPROST 0.05 MG/ML
1 SOLUTION/ DROPS OPHTHALMIC; TOPICAL AT BEDTIME
Refills: 0 | Status: DISCONTINUED | OUTPATIENT
Start: 2020-01-01 | End: 2020-01-01

## 2020-01-01 RX ORDER — TRAVOPROST 0.04 MG/ML
0 SOLUTION/ DROPS OPHTHALMIC DAILY
Qty: 7.5 | Refills: 3 | Status: DISCONTINUED | COMMUNITY
Start: 2017-05-19 | End: 2020-01-01

## 2020-01-01 RX ORDER — OMEPRAZOLE 20 MG/1
20 CAPSULE, DELAYED RELEASE ORAL DAILY
Qty: 90 | Refills: 3 | Status: ACTIVE | COMMUNITY
Start: 2017-05-19

## 2020-01-01 RX ORDER — TRAVOPROST 0.04 MG/ML
0 SOLUTION OPHTHALMIC
Qty: 7.5 | Refills: 3 | Status: ACTIVE | COMMUNITY
Start: 2020-01-01

## 2020-01-01 RX ORDER — DEXTROSE 50 % IN WATER 50 %
12.5 SYRINGE (ML) INTRAVENOUS ONCE
Refills: 0 | Status: COMPLETED | OUTPATIENT
Start: 2020-01-01 | End: 2020-01-01

## 2020-01-01 RX ORDER — VALPROIC ACID (AS SODIUM SALT) 250 MG/5ML
250 SOLUTION, ORAL ORAL
Refills: 0 | Status: DISCONTINUED | OUTPATIENT
Start: 2020-01-01 | End: 2020-01-01

## 2020-01-01 RX ORDER — METOPROLOL TARTRATE 25 MG/1
25 TABLET, FILM COATED ORAL
Qty: 180 | Refills: 3 | Status: ACTIVE | COMMUNITY
Start: 2017-05-19

## 2020-01-01 RX ORDER — LISINOPRIL 40 MG/1
40 TABLET ORAL DAILY
Qty: 90 | Refills: 3 | Status: ACTIVE | COMMUNITY
Start: 2017-05-19

## 2020-01-01 RX ORDER — INFLUENZA VIRUS VACCINE 15; 15; 15; 15 UG/.5ML; UG/.5ML; UG/.5ML; UG/.5ML
0.5 SUSPENSION INTRAMUSCULAR ONCE
Refills: 0 | Status: DISCONTINUED | OUTPATIENT
Start: 2020-01-01 | End: 2020-01-01

## 2020-01-01 RX ORDER — TRIAMCINOLONE ACETONIDE 1 MG/G
0.1 CREAM TOPICAL
Qty: 15 | Refills: 2 | Status: DISCONTINUED | COMMUNITY
Start: 2018-08-02 | End: 2020-01-01

## 2020-01-01 RX ORDER — FUROSEMIDE 40 MG
40 TABLET ORAL DAILY
Refills: 0 | Status: DISCONTINUED | OUTPATIENT
Start: 2020-01-01 | End: 2020-01-01

## 2020-01-01 RX ORDER — TRAVOPROST 0.04 MG/ML
1 SOLUTION/ DROPS OPHTHALMIC
Qty: 0 | Refills: 0 | DISCHARGE

## 2020-01-01 RX ORDER — IPRATROPIUM/ALBUTEROL SULFATE 18-103MCG
3 AEROSOL WITH ADAPTER (GRAM) INHALATION EVERY 6 HOURS
Refills: 0 | Status: DISCONTINUED | OUTPATIENT
Start: 2020-01-01 | End: 2020-01-01

## 2020-01-01 RX ORDER — VALPROIC ACID (AS SODIUM SALT) 250 MG/5ML
5 SOLUTION, ORAL ORAL
Qty: 0 | Refills: 0 | DISCHARGE

## 2020-01-01 RX ORDER — MORPHINE SULFATE 50 MG/1
0.5 CAPSULE, EXTENDED RELEASE ORAL EVERY 6 HOURS
Refills: 0 | Status: DISCONTINUED | OUTPATIENT
Start: 2020-01-01 | End: 2020-01-01

## 2020-01-01 RX ORDER — MORPHINE SULFATE 50 MG/1
0.5 CAPSULE, EXTENDED RELEASE ORAL
Refills: 0 | Status: DISCONTINUED | OUTPATIENT
Start: 2020-01-01 | End: 2020-01-01

## 2020-01-01 RX ORDER — OMEPRAZOLE 10 MG/1
1 CAPSULE, DELAYED RELEASE ORAL
Qty: 0 | Refills: 0 | DISCHARGE

## 2020-01-01 RX ORDER — PANTOPRAZOLE SODIUM 20 MG/1
40 TABLET, DELAYED RELEASE ORAL DAILY
Refills: 0 | Status: DISCONTINUED | OUTPATIENT
Start: 2020-01-01 | End: 2020-01-01

## 2020-01-01 RX ORDER — MORPHINE SULFATE 50 MG/1
1 CAPSULE, EXTENDED RELEASE ORAL
Qty: 100 | Refills: 0 | Status: DISCONTINUED | OUTPATIENT
Start: 2020-01-01 | End: 2020-01-01

## 2020-01-01 RX ORDER — SODIUM CHLORIDE 9 MG/ML
1000 INJECTION, SOLUTION INTRAVENOUS
Refills: 0 | Status: DISCONTINUED | OUTPATIENT
Start: 2020-01-01 | End: 2020-01-01

## 2020-01-01 RX ORDER — METOPROLOL TARTRATE 50 MG
1 TABLET ORAL
Qty: 0 | Refills: 0 | DISCHARGE

## 2020-01-01 RX ORDER — MORPHINE SULFATE 50 MG/1
0.5 CAPSULE, EXTENDED RELEASE ORAL EVERY 4 HOURS
Refills: 0 | Status: DISCONTINUED | OUTPATIENT
Start: 2020-01-01 | End: 2020-01-01

## 2020-01-01 RX ORDER — SCOPALAMINE 1 MG/3D
1 PATCH, EXTENDED RELEASE TRANSDERMAL
Refills: 0 | Status: DISCONTINUED | OUTPATIENT
Start: 2020-01-01 | End: 2020-01-01

## 2020-01-01 RX ORDER — PIPERACILLIN AND TAZOBACTAM 4; .5 G/20ML; G/20ML
3.38 INJECTION, POWDER, LYOPHILIZED, FOR SOLUTION INTRAVENOUS EVERY 8 HOURS
Refills: 0 | Status: DISCONTINUED | OUTPATIENT
Start: 2020-01-01 | End: 2020-01-01

## 2020-01-01 RX ORDER — FENTANYL CITRATE 50 UG/ML
1 INJECTION INTRAVENOUS
Refills: 0 | Status: DISCONTINUED | OUTPATIENT
Start: 2020-01-01 | End: 2020-01-01

## 2020-01-01 RX ORDER — AMOXICILLIN AND CLAVULANATE POTASSIUM 600; 42.9 MG/5ML; MG/5ML
600-42.9 FOR SUSPENSION ORAL
Qty: 160 | Refills: 0 | Status: DISCONTINUED | COMMUNITY
Start: 2019-09-11 | End: 2020-01-01

## 2020-01-01 RX ORDER — IPRATROPIUM BROMIDE AND ALBUTEROL SULFATE 2.5; .5 MG/3ML; MG/3ML
0.5-2.5 (3) SOLUTION RESPIRATORY (INHALATION)
Qty: 360 | Refills: 5 | Status: ACTIVE | COMMUNITY
Start: 2017-05-19

## 2020-01-01 RX ORDER — HEPARIN SODIUM 5000 [USP'U]/ML
5000 INJECTION INTRAVENOUS; SUBCUTANEOUS EVERY 12 HOURS
Refills: 0 | Status: DISCONTINUED | OUTPATIENT
Start: 2020-01-01 | End: 2020-01-01

## 2020-01-01 RX ORDER — MORPHINE SULFATE 50 MG/1
1 CAPSULE, EXTENDED RELEASE ORAL
Refills: 0 | Status: DISCONTINUED | OUTPATIENT
Start: 2020-01-01 | End: 2020-01-01

## 2020-01-01 RX ORDER — FUROSEMIDE 20 MG/1
20 TABLET ORAL
Qty: 30 | Refills: 0 | Status: ACTIVE | COMMUNITY
Start: 2020-01-01

## 2020-01-01 RX ORDER — VALPROIC ACID 250 MG/5ML
250 SOLUTION ORAL TWICE DAILY
Qty: 480 | Refills: 6 | Status: ACTIVE | COMMUNITY
Start: 2017-05-19

## 2020-01-01 RX ORDER — SODIUM HYPOCHLORITE 1.25 MG/ML
0.12 SOLUTION TOPICAL
Qty: 1 | Refills: 3 | Status: DISCONTINUED | COMMUNITY
Start: 2020-01-01 | End: 2020-01-01

## 2020-01-01 RX ORDER — LISINOPRIL 2.5 MG/1
1 TABLET ORAL
Qty: 0 | Refills: 0 | DISCHARGE

## 2020-01-01 RX ORDER — VANCOMYCIN HCL 1 G
1000 VIAL (EA) INTRAVENOUS ONCE
Refills: 0 | Status: COMPLETED | OUTPATIENT
Start: 2020-01-01 | End: 2020-01-01

## 2020-01-01 RX ADMIN — PIPERACILLIN AND TAZOBACTAM 25 GRAM(S): 4; .5 INJECTION, POWDER, LYOPHILIZED, FOR SOLUTION INTRAVENOUS at 12:38

## 2020-01-01 RX ADMIN — PIPERACILLIN AND TAZOBACTAM 25 GRAM(S): 4; .5 INJECTION, POWDER, LYOPHILIZED, FOR SOLUTION INTRAVENOUS at 18:35

## 2020-01-01 RX ADMIN — HEPARIN SODIUM 5000 UNIT(S): 5000 INJECTION INTRAVENOUS; SUBCUTANEOUS at 18:32

## 2020-01-01 RX ADMIN — Medication 26.25 MILLIGRAM(S): at 06:07

## 2020-01-01 RX ADMIN — Medication 26.25 MILLIGRAM(S): at 05:29

## 2020-01-01 RX ADMIN — PANTOPRAZOLE SODIUM 40 MILLIGRAM(S): 20 TABLET, DELAYED RELEASE ORAL at 12:39

## 2020-01-01 RX ADMIN — HEPARIN SODIUM 5000 UNIT(S): 5000 INJECTION INTRAVENOUS; SUBCUTANEOUS at 05:29

## 2020-01-01 RX ADMIN — SCOPALAMINE 1 PATCH: 1 PATCH, EXTENDED RELEASE TRANSDERMAL at 07:39

## 2020-01-01 RX ADMIN — SCOPALAMINE 1 PATCH: 1 PATCH, EXTENDED RELEASE TRANSDERMAL at 19:49

## 2020-01-01 RX ADMIN — Medication 3 MILLILITER(S): at 17:29

## 2020-01-01 RX ADMIN — PIPERACILLIN AND TAZOBACTAM 25 GRAM(S): 4; .5 INJECTION, POWDER, LYOPHILIZED, FOR SOLUTION INTRAVENOUS at 11:40

## 2020-01-01 RX ADMIN — Medication 250 MILLIGRAM(S): at 17:35

## 2020-01-01 RX ADMIN — MORPHINE SULFATE 1 MG/HR: 50 CAPSULE, EXTENDED RELEASE ORAL at 05:32

## 2020-01-01 RX ADMIN — HEPARIN SODIUM 5000 UNIT(S): 5000 INJECTION INTRAVENOUS; SUBCUTANEOUS at 17:20

## 2020-01-01 RX ADMIN — HEPARIN SODIUM 5000 UNIT(S): 5000 INJECTION INTRAVENOUS; SUBCUTANEOUS at 05:30

## 2020-01-01 RX ADMIN — HEPARIN SODIUM 5000 UNIT(S): 5000 INJECTION INTRAVENOUS; SUBCUTANEOUS at 18:12

## 2020-01-01 RX ADMIN — Medication 26.25 MILLIGRAM(S): at 17:25

## 2020-01-01 RX ADMIN — LATANOPROST 1 DROP(S): 0.05 SOLUTION/ DROPS OPHTHALMIC; TOPICAL at 21:40

## 2020-01-01 RX ADMIN — MORPHINE SULFATE 1 MG/HR: 50 CAPSULE, EXTENDED RELEASE ORAL at 15:42

## 2020-01-01 RX ADMIN — SCOPALAMINE 1 PATCH: 1 PATCH, EXTENDED RELEASE TRANSDERMAL at 14:16

## 2020-01-01 RX ADMIN — Medication 3 MILLILITER(S): at 11:29

## 2020-01-01 RX ADMIN — PIPERACILLIN AND TAZOBACTAM 25 GRAM(S): 4; .5 INJECTION, POWDER, LYOPHILIZED, FOR SOLUTION INTRAVENOUS at 20:37

## 2020-01-01 RX ADMIN — Medication 3 MILLILITER(S): at 17:08

## 2020-01-01 RX ADMIN — HEPARIN SODIUM 5000 UNIT(S): 5000 INJECTION INTRAVENOUS; SUBCUTANEOUS at 17:35

## 2020-01-01 RX ADMIN — SODIUM CHLORIDE 15 MILLILITER(S): 9 INJECTION, SOLUTION INTRAVENOUS at 21:27

## 2020-01-01 RX ADMIN — Medication 26.25 MILLIGRAM(S): at 17:35

## 2020-01-01 RX ADMIN — PIPERACILLIN AND TAZOBACTAM 25 GRAM(S): 4; .5 INJECTION, POWDER, LYOPHILIZED, FOR SOLUTION INTRAVENOUS at 02:09

## 2020-01-01 RX ADMIN — Medication 40 MILLIGRAM(S): at 00:06

## 2020-01-01 RX ADMIN — Medication 3 MILLILITER(S): at 06:00

## 2020-01-01 RX ADMIN — Medication 26.25 MILLIGRAM(S): at 18:14

## 2020-01-01 RX ADMIN — SCOPALAMINE 1 PATCH: 1 PATCH, EXTENDED RELEASE TRANSDERMAL at 08:04

## 2020-01-01 RX ADMIN — Medication 12.5 MILLILITER(S): at 06:24

## 2020-01-01 RX ADMIN — Medication 3 MILLILITER(S): at 23:56

## 2020-01-01 RX ADMIN — Medication 40 MILLIGRAM(S): at 05:30

## 2020-01-01 RX ADMIN — Medication 3 MILLILITER(S): at 17:09

## 2020-01-01 RX ADMIN — Medication 3 MILLILITER(S): at 23:51

## 2020-01-01 RX ADMIN — PANTOPRAZOLE SODIUM 40 MILLIGRAM(S): 20 TABLET, DELAYED RELEASE ORAL at 12:45

## 2020-01-01 RX ADMIN — PIPERACILLIN AND TAZOBACTAM 25 GRAM(S): 4; .5 INJECTION, POWDER, LYOPHILIZED, FOR SOLUTION INTRAVENOUS at 17:20

## 2020-01-01 RX ADMIN — PIPERACILLIN AND TAZOBACTAM 25 GRAM(S): 4; .5 INJECTION, POWDER, LYOPHILIZED, FOR SOLUTION INTRAVENOUS at 19:01

## 2020-01-01 RX ADMIN — MORPHINE SULFATE 1 MG/HR: 50 CAPSULE, EXTENDED RELEASE ORAL at 20:07

## 2020-01-01 RX ADMIN — Medication 3 MILLILITER(S): at 11:05

## 2020-01-01 RX ADMIN — Medication 3 MILLILITER(S): at 11:11

## 2020-01-01 RX ADMIN — PIPERACILLIN AND TAZOBACTAM 25 GRAM(S): 4; .5 INJECTION, POWDER, LYOPHILIZED, FOR SOLUTION INTRAVENOUS at 01:39

## 2020-01-01 RX ADMIN — MORPHINE SULFATE 1 MILLIGRAM(S): 50 CAPSULE, EXTENDED RELEASE ORAL at 14:10

## 2020-01-01 RX ADMIN — PIPERACILLIN AND TAZOBACTAM 25 GRAM(S): 4; .5 INJECTION, POWDER, LYOPHILIZED, FOR SOLUTION INTRAVENOUS at 11:22

## 2020-01-01 RX ADMIN — SCOPALAMINE 1 PATCH: 1 PATCH, EXTENDED RELEASE TRANSDERMAL at 19:28

## 2020-01-01 RX ADMIN — Medication 3 MILLILITER(S): at 05:23

## 2020-01-01 RX ADMIN — MORPHINE SULFATE 1 MG/HR: 50 CAPSULE, EXTENDED RELEASE ORAL at 20:08

## 2020-01-01 RX ADMIN — Medication 3 MILLILITER(S): at 05:41

## 2020-01-01 RX ADMIN — PIPERACILLIN AND TAZOBACTAM 25 GRAM(S): 4; .5 INJECTION, POWDER, LYOPHILIZED, FOR SOLUTION INTRAVENOUS at 02:02

## 2020-01-01 RX ADMIN — LATANOPROST 1 DROP(S): 0.05 SOLUTION/ DROPS OPHTHALMIC; TOPICAL at 21:49

## 2020-01-01 RX ADMIN — HEPARIN SODIUM 5000 UNIT(S): 5000 INJECTION INTRAVENOUS; SUBCUTANEOUS at 06:16

## 2020-01-01 RX ADMIN — HEPARIN SODIUM 5000 UNIT(S): 5000 INJECTION INTRAVENOUS; SUBCUTANEOUS at 05:45

## 2020-01-01 RX ADMIN — SCOPALAMINE 1 PATCH: 1 PATCH, EXTENDED RELEASE TRANSDERMAL at 06:05

## 2020-01-01 RX ADMIN — LATANOPROST 1 DROP(S): 0.05 SOLUTION/ DROPS OPHTHALMIC; TOPICAL at 21:18

## 2020-01-01 RX ADMIN — Medication 26.25 MILLIGRAM(S): at 05:45

## 2020-01-01 RX ADMIN — SODIUM CHLORIDE 15 MILLILITER(S): 9 INJECTION, SOLUTION INTRAVENOUS at 05:31

## 2020-01-01 RX ADMIN — Medication 26.25 MILLIGRAM(S): at 05:30

## 2020-01-01 RX ADMIN — MORPHINE SULFATE 1 MG/HR: 50 CAPSULE, EXTENDED RELEASE ORAL at 16:00

## 2020-01-01 RX ADMIN — SCOPALAMINE 1 PATCH: 1 PATCH, EXTENDED RELEASE TRANSDERMAL at 20:09

## 2020-01-01 RX ADMIN — MORPHINE SULFATE 1 MILLIGRAM(S): 50 CAPSULE, EXTENDED RELEASE ORAL at 13:55

## 2020-01-01 RX ADMIN — PANTOPRAZOLE SODIUM 40 MILLIGRAM(S): 20 TABLET, DELAYED RELEASE ORAL at 12:05

## 2020-01-01 RX ADMIN — Medication 26.25 MILLIGRAM(S): at 18:32

## 2020-01-01 RX ADMIN — PIPERACILLIN AND TAZOBACTAM 25 GRAM(S): 4; .5 INJECTION, POWDER, LYOPHILIZED, FOR SOLUTION INTRAVENOUS at 02:16

## 2020-02-27 NOTE — CURRENT MEDS
[High Risk Medications Reviewed and Reconciled (Beers Criteria)] : high risk medications reviewed and reconciled [Medication and Allergies Reconciled] : medication and allergies reconciled [Adherent to medications] : Patient is adherent to medications as prescribed [Reviewed patient reported medication adherence from Comprehensive Assessment] : Reviewed patient reported medication adherence from comprehensive assessment

## 2020-02-28 PROBLEM — Z78.9 PATIENT INCAPABLE OF MAKING INFORMED DECISIONS: Status: ACTIVE | Noted: 2017-05-19

## 2020-02-28 PROBLEM — R09.89 PULMONARY VASCULAR CONGESTION: Status: ACTIVE | Noted: 2020-01-01

## 2020-02-28 NOTE — REVIEW OF SYSTEMS
[Incontinence] : incontinence [Cough] : cough [Negative] : Gastrointestinal [Fever] : no fever [Chills] : no chills [Fatigue] : no fatigue [Constipation] : no constipation [de-identified] : as noted in HPI [FreeTextEntry1] : GHAZALA w/ son & aide

## 2020-02-28 NOTE — HEALTH RISK ASSESSMENT
[HRA Reviewed] : Health risk assessment reviewed [Full assistance needed] : managing finances [Patient not ambulatory (Wheelchair)] : Patient is not ambulatory (Wheelchair)

## 2020-02-28 NOTE — HISTORY OF PRESENT ILLNESS
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 [FreeTextEntry2] : PMH:  Hypertension, Parkinson's Disease, Parkinson's Dementia, Glaucoma, Seizure Disorder\par \par Interval events:  given furosemide x3 days for increase pulmonary vascular congestion on CXR from 2/10\par \par Pt in bed contracted, non- verbal unable to make needs know, and is dependent on all ADLs, making squeaking noises-  aide in home reports pt does that when she needs to cough, otherwise in usual state of health\par Has episodes of constipation- family uses MiraLAX as needed\par Sleeps well through night\par Still on  home care for (L) hip wound 3 times weekly- being treated with  Hydrofera blue packing and DSD\par \par \par Dementia: as above; on G-Tube feedings with 500 cc Nutren mixed with 500 cc of water.for 14 hours with free water 10 ounces twice daily with medications- tube stretched again after being changed in July\par \par Parkinson's Disease: off medications\par \par Seizure Disorder: no recent episodes; on valproic acid\par \par HTN: BP controlled with lisinopril, metoprolol

## 2020-02-28 NOTE — COUNSELING
[Underweight - ( BMI  <18.5 )] : underweight - ( BMI  <18.5 ) [Medical/Nutritional supplementation as prescribed] : medical/nutritional supplementation as prescribed [] : abdominal aortic ultrasound [Decrease hospital use] : decrease hospital use [Minimize unnecessary interventions] : minimize unnecessary interventions [Discussed disease trajectory with patient/caregiver] : discussed disease trajectory with patient/caregiver [Annual Discussion and review of: ___] : Annual discussion and review of [unfilled] [Completed DNR] : completed DNR [Completed Medical Orders for Life-Sustaining Treatment] : completed medical orders for life-sustaining treatment [DNR] : Code Status: DNR [Limited] : Treatment Guidelines: Limited [Trial of Intubation] : Intubation: Trial of Intubation [_____] : HCP: [unfilled] [[facility]: _____] [facility] Com[facility]/last verification date: [unfilled]

## 2020-02-28 NOTE — PHYSICAL EXAM
[No Acute Distress] : no acute distress [Well Nourished] : well nourished [Normal Sclera/Conjunctiva] : normal sclera/conjunctiva [Normal Outer Ear/Nose] : the ears and nose were normal in appearance [Normal Oropharynx] : the oropharynx was normal [No JVD] : no jugular venous distention [Supple] : the neck was supple [No Respiratory Distress] : no respiratory distress [No LAD] : no lymphadenopathy [Normal Rate] : heart rate was normal  [Regular Rhythm] : with a regular rhythm [No Accessory Muscle Use] : no accessory muscle use [No Edema] : there was no peripheral edema [Normal Appearance] : normal in appearance [Normal Bowel Sounds] : normal bowel sounds [No Masses] : no palpable masses [Soft] : abdomen soft [Non Tender] : non-tender [Not Distended] : not distended [Normal Post Cervical Nodes] : no posterior cervical lymphadenopathy [Normal Anterior Cervical Nodes] : no anterior cervical lymphadenopathy [No CVA Tenderness] : no ~M costovertebral angle tenderness [No Rash] : no rash [No Skin Lesions] : no skin lesions [de-identified] : frail female, contracted arms and hands; unable to make needs know [de-identified] : normal scattered rhonchi; squeaking respirations [de-identified] : stretched 20Fr Gtube in place [de-identified] : bedbound, upper and lower extremity contractures [de-identified] : dressing in place to (L) hip [de-identified] : unable to assess secondary to dementia [de-identified] : non verbal, demented

## 2020-03-18 NOTE — PHYSICAL EXAM
[No Acute Distress] : no acute distress [Well Nourished] : well nourished [Normal Sclera/Conjunctiva] : normal sclera/conjunctiva [Normal Outer Ear/Nose] : the ears and nose were normal in appearance [No JVD] : no jugular venous distention [No LAD] : no lymphadenopathy [No Respiratory Distress] : no respiratory distress [No Accessory Muscle Use] : no accessory muscle use [No Edema] : there was no peripheral edema [Normal Appearance] : normal in appearance [No Masses] : no palpable masses [Normal Bowel Sounds] : normal bowel sounds [Non Tender] : non-tender [Soft] : abdomen soft [Not Distended] : not distended [No CVA Tenderness] : no ~M costovertebral angle tenderness [No Rash] : no rash [No Skin Lesions] : no skin lesions [de-identified] : frail female, contracted arms and hands; unable to make needs know [de-identified] :  20Fr Gtube in place [de-identified] : bedbound, upper and lower extremity contractures [de-identified] : dressing in place to (L) hip [de-identified] : unable to assess secondary to dementia [de-identified] : non verbal, demented

## 2020-03-18 NOTE — HISTORY OF PRESENT ILLNESS
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

## 2020-03-18 NOTE — REASON FOR VISIT
[Acute] : an acute visit [Formal Caregiver] : formal caregiver [Pre-Visit Preparation] : pre-visit preparation was done [Intercurrent Specialty/Sub-specialty Visits] : the patient has no intercurrent specialty/sub-specialty visits [FreeTextEntry1] : for g-tube replacement

## 2020-05-01 PROBLEM — R05 COUGH, PERSISTENT: Status: ACTIVE | Noted: 2020-01-01

## 2020-05-01 PROBLEM — G20 PARKINSONS DISEASE: Status: ACTIVE | Noted: 2017-05-19

## 2020-06-05 NOTE — HISTORY OF PRESENT ILLNESS
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 [FreeTextEntry2] : PMH:  Hypertension, Parkinson's Disease, Parkinson's Dementia, Glaucoma, Seizure Disorder\par \par Interval events: call from home care RN this am that Gtube is broken \par \par Pt in bed contracted, non- verbal unable to make needs know, and is dependent on all ADLs- being seen today for gtube replacement - on G-Tube feedings with 500 cc Nutren mixed with 500 cc of water.for 14 hours with free water 10 ounces twice daily with medications\par Still on  home care for (L) hip wound 3 times weekly- being treated with Dakins Solution again \par Has episodes of constipation- family uses MiraLAX as needed\par Sleeps during day and awake at night \par Still on home care for (L) hip wound 3 times weekly- being treated with Hydrofera blue packing and DSD\par \par \par Dementia: as above\par \par Parkinson's Disease: off medications\par \par Seizure Disorder: no recent episodes; on valproic acid\par \par HTN: BP controlled with lisinopril, metoprolol. \par  \par

## 2020-06-05 NOTE — REVIEW OF SYSTEMS
[Cough] : cough [Incontinence] : incontinence [Negative] : Gastrointestinal [Fever] : no fever [Chills] : no chills [Fatigue] : no fatigue [Constipation] : no constipation [FreeTextEntry7] : as noted in HPI [de-identified] : as noted in HPI [FreeTextEntry1] : GHAZALA w/ son & aide

## 2020-06-05 NOTE — HEALTH RISK ASSESSMENT
[Full assistance needed] : managing finances [Patient not ambulatory (Wheelchair)] : Patient is not ambulatory (Wheelchair) [HRA Reviewed] : Health risk assessments reviewed

## 2020-06-05 NOTE — COUNSELING
[Underweight - ( BMI  <18.5 )] : underweight - ( BMI  <18.5 ) [Medical/Nutritional supplementation as prescribed] : medical/nutritional supplementation as prescribed [] : diabetic screening [Decrease hospital use] : decrease hospital use [Minimize unnecessary interventions] : minimize unnecessary interventions [Discussed disease trajectory with patient/caregiver] : discussed disease trajectory with patient/caregiver [Annual Discussion and review of: ___] : Annual discussion and review of [unfilled] [Completed DNR] : completed DNR [Completed Medical Orders for Life-Sustaining Treatment] : completed medical orders for life-sustaining treatment [DNR] : Code Status: DNR [Limited] : Treatment Guidelines: Limited [Trial of Intubation] : Intubation: Trial of Intubation [[facility]: _____] [facility]last verification date: [unfilled] [_____] : HCP: [unfilled]

## 2020-06-05 NOTE — PHYSICAL EXAM
[No Acute Distress] : no acute distress [Well Nourished] : well nourished [Normal Sclera/Conjunctiva] : normal sclera/conjunctiva [Normal Outer Ear/Nose] : the ears and nose were normal in appearance [No JVD] : no jugular venous distention [No LAD] : no lymphadenopathy [No Respiratory Distress] : no respiratory distress [No Accessory Muscle Use] : no accessory muscle use [No Edema] : there was no peripheral edema [Normal Appearance] : normal in appearance [No Masses] : no palpable masses [Normal Bowel Sounds] : normal bowel sounds [Non Tender] : non-tender [Soft] : abdomen soft [Not Distended] : not distended [No CVA Tenderness] : no ~M costovertebral angle tenderness [No Rash] : no rash [No Skin Lesions] : no skin lesions [de-identified] : frail female, contracted arms and hands; unable to make needs know [de-identified] :  20Fr Gtube in place [de-identified] : bedbound, upper and lower extremity contractures [de-identified] : dressing in place to (L) hip [de-identified] : non verbal, demented [de-identified] : unable to assess secondary to dementia

## 2020-08-10 PROBLEM — Z93.1 GASTROSTOMY TUBE DEPENDENT: Status: ACTIVE | Noted: 2019-03-29

## 2020-08-10 PROBLEM — R53.2 FUNCTIONAL QUADRIPLEGIA: Status: ACTIVE | Noted: 2017-05-19

## 2020-08-10 PROBLEM — G20 DEMENTIA DUE TO PARKINSON'S DISEASE WITHOUT BEHAVIORAL DISTURBANCE: Status: ACTIVE | Noted: 2017-05-19

## 2020-08-10 PROBLEM — G40.909 SEIZURE DISORDER: Status: ACTIVE | Noted: 2017-05-19

## 2020-08-10 PROBLEM — L98.412: Status: ACTIVE | Noted: 2019-01-30

## 2020-08-10 PROBLEM — K94.23 MALFUNCTION OF GASTROSTOMY TUBE: Status: RESOLVED | Noted: 2019-01-01 | Resolved: 2020-01-01

## 2020-09-17 PROBLEM — R06.03 RESPIRATORY DISTRESS: Status: ACTIVE | Noted: 2020-01-01

## 2020-09-17 NOTE — H&P ADULT - NSHPPHYSICALEXAM_GEN_ALL_CORE
ICU Vital Signs Last 24 Hrs  T(C): 33.2 (17 Sep 2020 09:59), Max: 33.2 (17 Sep 2020 09:59)  T(F): 91.8 (17 Sep 2020 09:59), Max: 91.8 (17 Sep 2020 09:59)  HR: 50 (17 Sep 2020 13:21) (48 - 88)  BP: 136/71 (17 Sep 2020 13:21) (136/71 - 151/86)  BP(mean): --  ABP: --  ABP(mean): --  RR: 22 (17 Sep 2020 13:21) (17 - 28)  SpO2: 100% (17 Sep 2020 13:21) (99% - 100%)    GENERAL: NAD well-developed  HEAD:  Atraumatic, Normocephalic  EYES: EOMI, PERRLA, conjunctiva and sclera clear  ENMT: No tonsillar erythema, exudates, or enlargement; Moist mucous membranes, Good dentition, No lesions  NECK: Supple, No JVD, Normal thyroid  NERVOUS SYSTEM:  Alert & Oriented X3, Good concentration; Motor Strength 5/5 B/L upper and lower extremities; DTRs 2+ intact and symmetric  CHEST/LUNG: Clear to percussion bilaterally; No rales, rhonchi, wheezing, or rubs  HEART: Regular rate and rhythm; No murmurs, rubs, or gallops  ABDOMEN: Soft, Nontender, Nondistended; Bowel sounds present  EXTREMITIES:  2+ Peripheral Pulses, No clubbing, cyanosis, or edema  LYMPH: No lymphadenopathy   SKIN: No rashes or lesions ICU Vital Signs Last 24 Hrs  T(C): 33.2 (17 Sep 2020 09:59), Max: 33.2 (17 Sep 2020 09:59)  T(F): 91.8 (17 Sep 2020 09:59), Max: 91.8 (17 Sep 2020 09:59)  HR: 50 (17 Sep 2020 13:21) (48 - 88)  BP: 136/71 (17 Sep 2020 13:21) (136/71 - 151/86)  BP(mean): --  ABP: --  ABP(mean): --  RR: 22 (17 Sep 2020 13:21) (17 - 28)  SpO2: 100% (17 Sep 2020 13:21) (99% - 100%)    GENERAL: functional quad contracted  HEAD:  Atraumatic, Normocephalic  EYES: EOMI, PERRLA, conjunctiva and sclera clear  ENMT: No tonsillar erythema, exudates, or enlargement; Moist mucous membranes, Good dentition, No lesions  NECK: Supple, No JVD, Normal thyroid  NERVOUS SYSTEM:  mute, Good concentration; Motor Strength 5/5 B/L upper and lower extremities; DTRs 2+ intact and symmetric  CHEST/LUNG: Clear to percussion bilaterally; No rales, rhonchi, wheezing, or rubs  HEART: Regular rate and rhythm; No murmurs, rubs, or gallops  ABDOMEN: Soft, Nontender, Nondistended; Bowel sounds present POSITIVE feeding tube and menendez   EXTREMITIES:  contracted   LYMPH: No lymphadenopathy   SKIN: No rashes or lesions

## 2020-09-17 NOTE — ED PROVIDER NOTE - NEUROLOGICAL, MLM
Alert and oriented, no focal deficits ,both arms contracted and pt non verbal. aaox0, no focal deficits ,both arms contracted and pt non verbal.

## 2020-09-17 NOTE — CONSULT NOTE ADULT - SUBJECTIVE AND OBJECTIVE BOX
Patient is a 89y old  Female who presents with a chief complaint of respiratory distress (17 Sep 2020 14:19)      HPI:  : 89 y/o F with pmhx HTN, DM, GERD, parkinson's presents BIBA for respiratory distress. Pt's head is down and has contracted extremities. DNR but no DNI. Pt in severe respiratory distress and cannot provide hx. ICU called for evaluation.  Patient on BIPAP Remains unable to answer questions. Family not at bedside.       Allergies : No Known Allergies    MEDICATIONS  (STANDING):    MEDICATIONS  (PRN):      Daily Height in cm: 167.64 (17 Sep 2020 09:59)    Drug Dosing Weight  Height (cm): 167.6 (17 Sep 2020 09:59)  Weight (kg): 79.4 (17 Sep 2020 09:59)  BMI (kg/m2): 28.3 (17 Sep 2020 09:59)  BSA (m2): 1.89 (17 Sep 2020 09:59)    PAST MEDICAL & SURGICAL HISTORY:  Parkinson disease  DM (diabetes mellitus)  HTN (hypertension)  Glaucoma      ADVANCE DIRECTIVES: DNR not DNI    REVIEW OF SYSTEMS:    unable to assess pt unable to give history    ICU Vital Signs Last 24 Hrs  T(C): 33.2 (17 Sep 2020 09:59), Max: 33.2 (17 Sep 2020 09:59)  T(F): 91.8 (17 Sep 2020 09:59), Max: 91.8 (17 Sep 2020 09:59)  HR: 50 (17 Sep 2020 13:21) (48 - 88)  BP: 136/71 (17 Sep 2020 13:21) (136/71 - 151/86)  BP(mean): --  ABP: --  ABP(mean): --  RR: 22 (17 Sep 2020 13:21) (17 - 28)  SpO2: 100% (17 Sep 2020 13:21) (99% - 100%)      ABG - ( 17 Sep 2020 11:10 )  pH, Arterial: x     pH, Blood: 7.32  /  pCO2: 50    /  pO2: 111   / HCO3: 25    / Base Excess: -0.8  /  SaO2: 98              PHYSICAL EXAM:    GENERAL: NAD, well-groomed, well-developed  HEAD:  Atraumatic, Normocephalic  EYES: EOMI, PERRLA, conjunctiva and sclera clear  ENMT: No tonsillar erythema, exudates, or enlargement; Moist mucous membranes, Good dentition, No lesions  NECK: Supple, No JVD, Normal thyroid  NERVOUS SYSTEM:  Alert & Oriented X3, Good concentration; Motor Strength 5/5 B/L upper and lower extremities; DTRs 2+ intact and symmetric  CHEST/LUNG: Clear to percussion bilaterally; No rales, rhonchi, wheezing, or rubs  HEART: Regular rate and rhythm; No murmurs, rubs, or gallops  ABDOMEN: Soft, Nontender, Nondistended; Bowel sounds present  EXTREMITIES:  2+ Peripheral Pulses, No clubbing, cyanosis, or edema  LYMPH: No lymphadenopathy noted  SKIN: No rashes or lesions    LABS:  CBC Full  -  ( 17 Sep 2020 10:16 )  WBC Count : 6.95 K/uL  RBC Count : 3.35 M/uL  Hemoglobin : 10.4 g/dL  Hematocrit : 33.1 %  Platelet Count - Automated : 103 K/uL  Mean Cell Volume : 98.8 fl  Mean Cell Hemoglobin : 31.0 pg  Mean Cell Hemoglobin Concentration : 31.4 gm/dL  Auto Neutrophil # : 5.49 K/uL  Auto Lymphocyte # : 1.25 K/uL  Auto Monocyte # : 0.21 K/uL  Auto Eosinophil # : 0.00 K/uL  Auto Basophil # : 0.00 K/uL  Auto Neutrophil % : 74.0 %  Auto Lymphocyte % : 18.0 %  Auto Monocyte % : 3.0 %  Auto Eosinophil % : 0.0 %  Auto Basophil % : 0.0 %    09-17    134<L>  |  100  |  33<H>  ----------------------------<  75  4.1   |  28  |  0.41<L>    Ca    9.5      17 Sep 2020 10:16    TPro  8.1  /  Alb  1.4<L>  /  TBili  0.3  /  DBili  x   /  AST  27  /  ALT  12  /  AlkPhos  103  09-17    CAPILLARY BLOOD GLUCOSE    PT/INR - ( 17 Sep 2020 10:16 )   PT: 12.5 sec;   INR: 1.08 ratio    PTT - ( 17 Sep 2020 10:16 )  PTT:43.5 sec    CARDIAC MARKERS ( 17 Sep 2020 10:16 )  <.015 ng/mL / x     / x     / x     / x          RADIOLOGY:       Patient is a 89y old  Female who presents with a chief complaint of respiratory distress (17 Sep 2020 14:19)      HPI:  89 y/o F with pmhx HTN, DM, GERD, parkinson's presents BIBA for respiratory distress. Pt's head is down and has contracted extremities. DNR but no DNI. Pt in severe respiratory distress and cannot provide hx. ICU called for evaluation.  Patient on BIPAP Remains unable to answer questions. Family not at bedside.       Allergies : No Known Allergies      Daily Height in cm: 167.64 (17 Sep 2020 09:59)    Drug Dosing Weight  Height (cm): 167.6 (17 Sep 2020 09:59)  Weight (kg): 79.4 (17 Sep 2020 09:59)  BMI (kg/m2): 28.3 (17 Sep 2020 09:59)  BSA (m2): 1.89 (17 Sep 2020 09:59)    PAST MEDICAL & SURGICAL HISTORY:  Parkinson disease  DM (diabetes mellitus)  HTN (hypertension)  Glaucoma      ADVANCE DIRECTIVES: DNR not DNI per charts     REVIEW OF SYSTEMS:    unable to assess pt unable to give history    ICU Vital Signs Last 24 Hrs  T(C): 33.2 (17 Sep 2020 09:59), Max: 33.2 (17 Sep 2020 09:59)  T(F): 91.8 (17 Sep 2020 09:59), Max: 91.8 (17 Sep 2020 09:59)  HR: 50 (17 Sep 2020 13:21) (48 - 88)  BP: 136/71 (17 Sep 2020 13:21) (136/71 - 151/86)  BP(mean): --  ABP: --  ABP(mean): --  RR: 22 (17 Sep 2020 13:21) (17 - 28)  SpO2: 100% (17 Sep 2020 13:21) (99% - 100%)      ABG - ( 17 Sep 2020 11:10 )  pH, Arterial: x     pH, Blood: 7.32  /  pCO2: 50    /  pO2: 111   / HCO3: 25    / Base Excess: -0.8  /  SaO2: 98          LABS:  CBC Full  -  ( 17 Sep 2020 10:16 )  WBC Count : 6.95 K/uL  RBC Count : 3.35 M/uL  Hemoglobin : 10.4 g/dL  Hematocrit : 33.1 %  Platelet Count - Automated : 103 K/uL  Mean Cell Volume : 98.8 fl  Mean Cell Hemoglobin : 31.0 pg  Mean Cell Hemoglobin Concentration : 31.4 gm/dL  Auto Neutrophil # : 5.49 K/uL  Auto Lymphocyte # : 1.25 K/uL  Auto Monocyte # : 0.21 K/uL  Auto Eosinophil # : 0.00 K/uL  Auto Basophil # : 0.00 K/uL  Auto Neutrophil % : 74.0 %  Auto Lymphocyte % : 18.0 %  Auto Monocyte % : 3.0 %  Auto Eosinophil % : 0.0 %  Auto Basophil % : 0.0 %    09-17    134<L>  |  100  |  33<H>  ----------------------------<  75  4.1   |  28  |  0.41<L>    Ca    9.5      17 Sep 2020 10:16    TPro  8.1  /  Alb  1.4<L>  /  TBili  0.3  /  DBili  x   /  AST  27  /  ALT  12  /  AlkPhos  103  09-17    CAPILLARY BLOOD GLUCOSE    PT/INR - ( 17 Sep 2020 10:16 )   PT: 12.5 sec;   INR: 1.08 ratio    PTT - ( 17 Sep 2020 10:16 )  PTT:43.5 sec    CARDIAC MARKERS ( 17 Sep 2020 10:16 )  <.015 ng/mL / x     / x     / x     / x          RADIOLOGY:    < from: Xray Chest 1 View-PORTABLE IMMEDIATE (09.17.20 @ 10:38) >  EXAM:  XR CHEST PORTABLE IMMED 1V                            PROCEDURE DATE:  09/17/2020          INTERPRETATION:  History: Sepsis    Portable radiograph of the chest is performed. comparison is made to 8/27/2007.    Patient is contracted with armspartially obscuring the lung fields. The heart size is borderline. Right lung is clear. There is questionable infiltrate or atelectasis at the left lung base.    Impression: Limited by positioning. Questionable infiltrate at the left lung base            AMANDEEP POMPA M.D.,ATTENDING RADIOLOGIST  This document has been electronically signed. Sep 17 2020  2:29PM    < end of copied text >

## 2020-09-17 NOTE — H&P ADULT - ASSESSMENT
89 female with parkingsons with acute respitratory failure and sepsis requiring barehugger - rejected by icu - patient is dnr dni       IMPROVE VTE Individual Risk Assessment          RISK                                                          Points  [  ] Previous VTE                                                3  [  ] Thrombophilia                                             2  [  ] Lower limb paralysis                                   2        (unable to hold up >15 seconds)    [  ] Current Cancer                                             2         (within 6 months)  [  ] Immobilization > 24 hrs                              1  [  ] ICU/CCU stay > 24 hours                             1  [  ] Age > 60                                                         1    IMPROVE VTE Score:

## 2020-09-17 NOTE — ED PROVIDER NOTE - CONSTITUTIONAL, MLM
normal... severe respiratory distress on CPAP with EMS awake, alert, oriented to person, place, time/situation.

## 2020-09-17 NOTE — H&P ADULT - NSHPREVIEWOFSYSTEMS_GEN_ALL_CORE

## 2020-09-17 NOTE — CONSULT NOTE ADULT - SUBJECTIVE AND OBJECTIVE BOX
MONTEZ WHALENWAVERENICE    [facility] TELE 05    Patient is a 89y old  Female who presents with a chief complaint of respiratory distress (17 Sep 2020 14:39)       Allergies    No Known Allergies    Intolerances        HPI:  : 89 y/o F with pmhx HTN, DM, GERD, parkinson's presents BIBA for respiratory distress. Pt's head is down and has contracted extremities. DNR but no DNI. Pt in severe respiratory distress and cannot provide hx.     (17 Sep 2020 14:19)      PAST MEDICAL & SURGICAL HISTORY:  Parkinson disease    DM (diabetes mellitus)    HTN (hypertension)    Glaucoma        FAMILY HISTORY:        MEDICATIONS   latanoprost 0.005% Ophthalmic Solution 1 Drop(s) Both EYES at bedtime  pantoprazole  Injectable 40 milliGRAM(s) IV Push daily  piperacillin/tazobactam IVPB.. 3.375 Gram(s) IV Intermittent every 8 hours  valproate sodium IVPB 250 milliGRAM(s) IV Intermittent two times a day  vancomycin  IVPB 1000 milliGRAM(s) IV Intermittent once      Vital Signs Last 24 Hrs  T(C): 33.2 (17 Sep 2020 09:59), Max: 33.2 (17 Sep 2020 09:59)  T(F): 91.8 (17 Sep 2020 09:59), Max: 91.8 (17 Sep 2020 09:59)  HR: 88 (17 Sep 2020 15:01) (48 - 88)  BP: 136/71 (17 Sep 2020 13:21) (136/71 - 151/86)  BP(mean): --  RR: 22 (17 Sep 2020 13:21) (17 - 28)  SpO2: 100% (17 Sep 2020 15:01) (99% - 100%)            LABS:                        10.4   6.95  )-----------( 103      ( 17 Sep 2020 10:16 )             33.1     09-17    134<L>  |  100  |  33<H>  ----------------------------<  75  4.1   |  28  |  0.41<L>    Ca    9.5      17 Sep 2020 10:16    TPro  8.1  /  Alb  1.4<L>  /  TBili  0.3  /  DBili  x   /  AST  27  /  ALT  12  /  AlkPhos  103  09-17    PT/INR - ( 17 Sep 2020 10:16 )   PT: 12.5 sec;   INR: 1.08 ratio         PTT - ( 17 Sep 2020 10:16 )  PTT:43.5 sec      ABG - ( 17 Sep 2020 11:10 )  pH, Arterial: x     pH, Blood: 7.32  /  pCO2: 50    /  pO2: 111   / HCO3: 25    / Base Excess: -0.8  /  SaO2: 98                  WBC:  WBC Count: 6.95 K/uL (09-17 @ 10:16)      MICROBIOLOGY:  RECENT CULTURES:        CARDIAC MARKERS ( 17 Sep 2020 10:16 )  <.015 ng/mL / x     / x     / x     / x            PT/INR - ( 17 Sep 2020 10:16 )   PT: 12.5 sec;   INR: 1.08 ratio         PTT - ( 17 Sep 2020 10:16 )  PTT:43.5 sec    Sodium:  Sodium, Serum: 134 mmol/L (09-17 @ 10:16)      0.41 mg/dL 09-17 @ 10:16      Hemoglobin:  Hemoglobin: 10.4 g/dL (09-17 @ 10:16)      Platelets: Platelet Count - Automated: 103 K/uL (09-17 @ 10:16)      LIVER FUNCTIONS - ( 17 Sep 2020 10:16 )  Alb: 1.4 g/dL / Pro: 8.1 gm/dL / ALK PHOS: 103 U/L / ALT: 12 U/L / AST: 27 U/L / GGT: x                 RADIOLOGY & ADDITIONAL STUDIES:

## 2020-09-17 NOTE — H&P ADULT - HISTORY OF PRESENT ILLNESS
: 89 y/o F with pmhx HTN, DM, GERD, parkinson's presents BIBA for respiratory distress. Pt's head is down and has contracted extremities. DNR but no DNI. Pt in severe respiratory distress and cannot provide hx.     : 89 y/o F with pmhx HTN, DM, GERD, parkinson's presents BIBA for respiratory distress. Pt's head is down and has contracted extremities. DNR but no DNI. Pt in severe respiratory distress and cannot provide history.

## 2020-09-17 NOTE — CONSULT NOTE ADULT - ATTENDING COMMENTS
89 year old female with dementia, contractures, hypertension, and hyperlipidemia presents with respiratory distress and worsening mental status. Admitted for acute hypoxemic and hypercapnic respiratory failure requiring BiPAP support. Evidence of left sided on pneumonia on chest imaging. She appears comfortable on BiPAP with stable vital signs.    Given age and comorbidities agree with pursuing goals of care discussion with family regarding their wishes should be deteriorate  Continue BiPAP support  Antibiotics for pneumonia  No indication for ICU at this time 89 year old female with dementia, contractures, hypertension, and hyperlipidemia presents with respiratory distress and worsening mental status. Admitted for acute hypoxemic and hypercapnic respiratory failure requiring BiPAP support. Evidence of left sided on pneumonia on chest imaging. She appears comfortable on BiPAP with stable vital signs.    Given age and comorbidities agree with pursuing goals of care discussion with family regarding their wishes should she deteriorate  Continue BiPAP support  Antibiotics for pneumonia  No indication for ICU at this time

## 2020-09-17 NOTE — ED ADULT NURSE REASSESSMENT NOTE - NS ED NURSE REASSESS COMMENT FT1
Received pt at 1050 from VIRGIL Villalpando. Pt is on  bipap. menendez placed Pt is bradycardic. Dr Jean is aware.. Pt has DNR but no DNI in place.

## 2020-09-17 NOTE — ED ADULT TRIAGE NOTE - CHIEF COMPLAINT QUOTE
BIBA with PMH of HTN, GERD, PARKINSON'S, & T2DM. According to family members pt had difficulty breathing this morning.

## 2020-09-17 NOTE — ED ADULT NURSE REASSESSMENT NOTE - NS ED NURSE REASSESS COMMENT FT1
Pt was placed on a bear hugger for hypothermia. Angel in place since 1 PM no urine output.  Dr Jean is aware

## 2020-09-17 NOTE — ED PROVIDER NOTE - OBJECTIVE STATEMENT
89 y/o F with pmhx HTN, DM, GERD, parkinson's presents BIBA for respiratory distress. Pt's head is down and has contracted extremities. DNR but no DNI. Pt in severe respiratory distress and cannot provide hx.

## 2020-09-17 NOTE — ED PROVIDER NOTE - MUSCULOSKELETAL, MLM
Spine appears normal, range of motion is not limited, no muscle or joint tenderness, no edema noted.

## 2020-09-17 NOTE — H&P ADULT - NSICDXPASTMEDICALHX_GEN_ALL_CORE_FT
PAST MEDICAL HISTORY:  DM (diabetes mellitus)     Glaucoma     HTN (hypertension)     Parkinson disease

## 2020-09-17 NOTE — CONSULT NOTE ADULT - ASSESSMENT
89 y/o F with pmhx HTN, DM, GERD, parkinson's presents BIBA for respiratory distress.    Pt seen bedside in ED, on BIPAP 12/5 with rate of 12, TV about 200-250, saturation 100%.  Breathing comfortably, NAD.  CXR noted to have left sided infiltrate - would continue abx to cover for pneumonia, ctx/azithro to cover for CAP.  ABG with mild hypercapnia, would recheck after being on BIPAP.  Check blood/urine cultures, legionella.  Continue with supportive BIPAP to keep sats about 92%, wean as tolerated to NC.  Noted to be DNR but not DNI per charts, would consider GOC/palliative consult, pt poor candidate for intubation at this time. 89 y/o F with pmhx HTN, DM, GERD, parkinson's presents BIBA for respiratory distress.    Pt seen bedside in ED, on BIPAP 12/5 with rate of 12, TV about 200-250, saturation 100%.  Breathing comfortably, NAD.  CXR noted to have left sided infiltrate - would continue abx to cover for pneumonia, ctx/azithro to cover for CAP.  ABG with mild hypercapnia, would recheck after being on BIPAP.  Check blood/urine cultures, legionella.  Continue with supportive BIPAP to keep sats about 92%, wean as tolerated to NC.  Noted to be DNR but not DNI per charts, would consider GOC/palliative consult 89 y/o F with pmhx HTN, DM, GERD, parkinson's presents BIBA for respiratory distress.    Pt seen bedside in ED, on BIPAP 12/5 with rate of 12, TV about 200-250, saturation 100%.  Breathing comfortably, NAD.  CXR noted to have left sided infiltrate - would continue abx to cover for pneumonia, ctx/azithro to cover for CAP.  ABG with mild hypercapnia, would recheck after being on BIPAP.  Check blood/urine cultures, legionella.  Continue with supportive BIPAP to keep sats about 92%, wean as tolerated to NC.  Noted to be DNR but not DNI per charts, would consider GOC/palliative consult.    Does not require ICU level of care at this time.

## 2020-09-17 NOTE — ED PROVIDER NOTE - UNABLE TO OBTAIN
Pt hx is limited as she is in severe respiratory distress. Severe Illness/Injury Pt hx limited as she is in respiratory distress.

## 2020-09-17 NOTE — CONSULT NOTE ADULT - ASSESSMENT
IRMA HERRMANN [phone]  7/15/1930 [address]A 2020 DR RON SCHNEIDER            Initial evaluation/Pulmonary Critical Care consultation requested on 2020   by Dr Schneider from Dr Malik   Patient examined chart reviewed    HOSPITAL ADMISSION   PATIENT CAME  FROM (if information available)      IRMA HERRMANN [phone]  7/15/1930 DOA 2020 DR RON SCHNEIDER     REVIEW OF SYMPTOMS      Able to give ROS  NO     PHYSICAL EXAM    HEENT Unremarkable PERRLA atraumatic   RESP Fair air entry EXP prolonged    Harsh breath sound Resp distres mild   CARDIAC S1 S2 No S3     NO JVD    ABDOMEN SOFT BS PRESENT NOT DISTENDED No hepatosplenomegaly PEDAL EDEMA present No calf tenderness  NO rash   GENERAL Not TOXIC looking    CC.   2020   BIBA with PMH of HTN, GERD, PARKINSON'S, & T2DM. According to family members pt had difficulty breathing this morning.  difficulty breathing    HPI/PMH.      89 y/o F with pmhx HTN, DM, GERD, parkinson's presents BIBA for respiratory distress. Pt's head is down and has contracted extremities. DNR but no DNI. Pt in severe respiratory distress and cannot provide hx     Home meds.      OXYGENATION        VITALS/LABS       2020 130/70 48        PATIENT SUMMARY.   89 y/o F with pmhx HTN, DM, GERD, parkinson's presents BIBA for respiratory distress  Pulmonary consulted 2020       PROBLEM/ASSESSMENT/RECOMMENDATIONS.  DYSPNEA   Possibly sec asp pneu  HYPERCAPNIC RESP FAILURE   ABG 2020 50% 732/50/111   On bpap   A/R Decrease O2   O2 to keep po 90-95%  COPD   duoneb  PNEUMONIA        la 2020 la 1.2   W 2020 w 6.9  b 2020 b 11    CXR 2020 cxr poss infiltr l base   ZOSYN zosyn ()   Cont zosyn for now   Follow covid tests   Follow cultures   A/R Check ct chest pr      TIME SPENT   Over 25 minutes aggregate care time spent on encounter; activities included   direct patient care, counseling and/or coordinating care reviewing notes, lab data/ imaging , discussion with multidisciplinary team/ patient  /family and explaining in detail risks, benefits, alternatives  of the recommendations     IRMA HERRMANN [phone]  7/15/1930 DOA 2020 DR RON SCHNEIDER

## 2020-09-17 NOTE — GOALS OF CARE CONVERSATION - ADVANCED CARE PLANNING - CONVERSATION DETAILS
Extensive conversation held with patients daughter Kelsie at the bedside and Harish via telephone. Unable to reach Armani during call. Discussed that if intubated, their mother is unlikely to come off the ventilator safely, most likely eventually leading to a tracheostomy placement for long term ventilation, with minimal chance of overall recovery. As per the patients daughter, the patients baseline status is bedbound and unable to communicate, pt is contracted, with advanced Parkinson's disease. Intubation will not likely prolong survivability but only cause undue suffering with the patients advanced age and multiple underlying comorbidities. The patients sons, Armani and Harish need more time to make a decision. The daughter Kelsie is the official HCP but all 3 siblings make decisions together. Dr. Schneider notified of above conversation. Daughter educated that morphine IV was ordered for any signs of SOB and verbalized understanding.  MOLST completed yesterday and was reviewed, Pt remains a DNR, not a DNI.  Daughter will notifiy Dr. Schneider when a final decision is made. Extensive conversation held with patients daughter Kelsie at the bedside and Harish via telephone. Unable to reach Armani during call. Discussed that if intubated, their mother is unlikely to come off the ventilator safely, most likely eventually leading to a tracheostomy placement for long term ventilation, with minimal chance of overall recovery. As per the patients daughter, the patients baseline status is bedbound and unable to communicate, pt is contracted, with advanced Parkinson's disease. Intubation will not likely prolong survivability but only cause undue suffering with the patients advanced age and multiple underlying comorbidities. The patients sons, Armani and Harish need more time to make a decision regarding intubation if needed. The patient remains on a Bipap upon exam. The daughter Kelsie is the official HCP but all 3 siblings make decisions together. Dr. Schneider notified of above conversation. Daughter educated that morphine IV was ordered for any signs of SOB and verbalized understanding.  MOLST completed yesterday and was reviewed, Pt remains a DNR, not a DNI.  Daughter will notifiy Dr. Schneider when a final decision is made.

## 2020-09-18 NOTE — PROGRESS NOTE ADULT - PROBLEM SELECTOR PLAN 6
DNR/DNI  palliative following, hospice referral made  heparin SQ-dvt ppx  fall, aspiration precautions DNR/DNI  palliative following, hospice referral made  heparin SQ-dvt ppx  fall, aspiration precautions  poor prognosis

## 2020-09-18 NOTE — CONSULT NOTE ADULT - ASSESSMENT
: 89 y/o F with pmhx HTN, DM, GERD, parkinson's presents BIBA for respiratory distress. Palliative care consulted for GOC discussion in the setting of advanced age and multiple underlying comorbidities.

## 2020-09-18 NOTE — CONSULT NOTE ADULT - PROBLEM SELECTOR RECOMMENDATION 9
Recommend use of NC and low dose opioids for any SOB that arises.  Continue PRN Morphine 0.5 mg IVP as needed for any respiratory distress.  Please do not place pt on Bipap, as pt cannot remove on her own if any emergency such as vomiting were to occur, pt is contracted, with end stage parkinsons, non-verbal, and unable to move any extremities. Not an appropriate candidate for the use of Bipap. Meeting held with palliative care team (myself and Dr. Phillips) and the patients daughter and HCP Kelsie and son Harish. Extensive conversation held regarding the severity of their mothers current medical condition, along with her advanced age and multiple underlying comorbidities. The patients children verbalized understanding that their mother is very ill and may likely only have days to weeks left to live. Hospice education provided and the family verbalized understanding and was interested in speaking with [facility] Liaison. [facility] referral placed by SW. Their ultimate goal is to get their mother home with hospice services if she does not improve with current medical treatment. They were also educated on medications that will be ordered to provide symptom management in the case that their mother decompensates and becomes active in the dying process. The patient is now a DNR/DNI.  GHASSAN in chart. Meeting held with palliative care team (myself and Dr. Phillips) and the patients daughter and HCP Kelsie and son Harish. Extensive conversation held regarding the severity of their mothers current medical condition, along with her advanced age and multiple underlying comorbidities. The patients children verbalized understanding that their mother is very ill and may likely only have days to weeks left to live. Hospice education provided and the family verbalized understanding and was interested in speaking with [facility] Liaison. [facility] referral placed by SW. Their ultimate goal is to get their mother home with hospice services if she does not improve with current medical treatment. They were also educated on medications that will be ordered to provide symptom management in the case that their mother decompensates and becomes active in the dying process. The patient is now a DNR/DNI. Plan is to see how the patient does in the next 24-48 hours with current medication regimen and IV abx.   EMERYST in chart.

## 2020-09-18 NOTE — GOALS OF CARE CONVERSATION - ADVANCED CARE PLANNING - CONVERSATION DETAILS
Patient is approved inpatient hospice. Consents were faxed to unit for family however family decided to leave for the night and requested to sign tomorrow at noon. Explained that patient is actively dying  and that if hospice is important to family that it is possible that she will not make admission if delayed. Son states understanding, explaining that he is tired and needed to go home. Son to arrive at noon to nurses station for documents and call [facility] to review. [phone].

## 2020-09-18 NOTE — CONSULT NOTE ADULT - CONVERSATION DETAILS
Meeting held with palliative care team (myself and Dr. Phillips) and the patients daughter and HCP Kelsie and son Harish. Extensive conversation held regarding the severity of their mothers current medical condition, along with her advanced age and multiple underlying comorbidities. The patients children verbalized understanding that their mother is very ill and may likely only have days to weeks left to live. Hospice education provided and the family verbalized understanding and was interested in speaking with [facility] Liaison. [facility] referral placed by SW. Their ultimate goal is to get their mother home with hospice services if she does not improve with current medical treatment. They were also educated on medications that will be ordered to provide symptom management in the case that their mother decompensates and becomes active in the dying process. The patient is now a DNR/DNI.  GHASSAN in chart. Meeting held with palliative care team (myself and Dr. Phillips) and the patients daughter and HCP Kelsie and son Harish. Extensive conversation held regarding the severity of their mothers current medical condition, along with her advanced age and multiple underlying comorbidities. The patients children verbalized understanding that their mother is very ill and may likely only have days to weeks left to live. Hospice education provided and the family verbalized understanding and was interested in speaking with [facility] Liaison. [facility] referral placed by SW. Their ultimate goal is to get their mother home with hospice services if she does not improve with current medical treatment. They were also educated on medications that will be ordered to provide symptom management in the case that their mother decompensates and becomes active in the dying process. The patient is now a DNR/DNI. Plan is to see how the patient does in the next 24-48 hours with current medication regimen and IV abx.   EMERYST in chart.

## 2020-09-18 NOTE — DIETITIAN INITIAL EVALUATION ADULT. - ENERGY NEEDS
Height (cm): 167.6 (09-17)  Weight (kg): 67.2 (09-18)  BMI (kg/m2): 23.9 (09-18)  IBW:  58.9kg   % IBW:  114%     UBW:  ?           %UBW ?

## 2020-09-18 NOTE — PROGRESS NOTE ADULT - ASSESSMENT
cont rx   cont rx      IRMA HERRMANN [phone]  7/15/1930 DOA 2020 DR RON TEJEDA     REVIEW OF SYMPTOMS      Able to give ROS  NO     PHYSICAL EXAM    HEENT Unremarkable PERRLA atraumatic   RESP Fair air entry EXP prolonged    Harsh breath sound Resp distres mild   CARDIAC S1 S2 No S3     NO JVD    ABDOMEN SOFT BS PRESENT NOT DISTENDED No hepatosplenomegaly PEDAL EDEMA present No calf tenderness  NO rash   GENERAL Not TOXIC looking    PT DATA/BEST PRACTICE  ALLERGY   nka                  WT               2020 79                       BMI               2020 28                             ADVANCED DIRECTIVE  dnr   LINES TUBES POA.  PROCEDURES.              HEAD OF BED ELEVATION Yes      DVT PROPHYLAXIS.   hpsc ()      DYSPHAGIA EVAL .    DIET..   npo ()                                                                         IV F.  WILLIS PROPHYLAXIS. protonix 40 ()   INFECTION PPLX                                                     OXYGENATION        VITALS/LABS       2020 afeb 70 100/59   2020 12//.4       PATIENT SUMMARY.   89 y/o F with pmhx HTN, DM, GERD, parkinson's presents BIBA for respiratory distress  Pulmonary consulted 2020       PROBLEM/ASSESSMENT/RECOMMENDATIONS.  DYSPNEA   Possibly sec asp pneu  HYPERCAPNIC RESP FAILURE   ABG 2020 50% 732/50/111   Pt seen by Palliative   Pt considered to be too risky for bpap use as he is lethargic   O2 to keep po 90-95%  COPD   duoneb  PNEUMONIA      2020 Pt had elevated pr (0.47 on 2020) l base infiltr His leg and mrsa were neg   So cont zosyn ()     COVID STATUS   2020 covid naive   [facility]   Hospice being considered        TIME SPENT   Over 25 minutes aggregate care time spent on encounter; activities included   direct patient care, counseling and/or coordinating care reviewing notes, lab data/ imaging , discussion with multidisciplinary team/ patient  /family and explaining in detail risks, benefits, alternatives  of the recommendations     IRMA HERRMANN [phone]  7/15/1930 DOA 2020 DR RON TEJEDA

## 2020-09-18 NOTE — CHART NOTE - NSCHARTNOTEFT_GEN_A_CORE
Pt examined and appears to be actively dying, SOB noted with labored respirations-Morphine gtt ordered by primary team. Medications ordered for symptom management. Primary team and Cammie notified and aware. The patients family will be arriving shortly.

## 2020-09-18 NOTE — PROGRESS NOTE ADULT - ASSESSMENT
89 female with parkingsons with acute respitratory failure and sepsis requiring barehugger - rejected by icu - patient is dnr dni

## 2020-09-18 NOTE — PROGRESS NOTE ADULT - PROBLEM SELECTOR PLAN 2
DNI/DNR  BIPAP contraindicated, patient has poor mentation/ advanced dementia, nonverbal , does not open her eyes, does not follow commands  ++contracted  supplemental oxygen via NC   morphine drip started for respiratory distress and air hunger  scopalamine patch for copious secretions , frequent suctioning  will give Lasix as well , BNP elevated

## 2020-09-18 NOTE — PROGRESS NOTE ADULT - SUBJECTIVE AND OBJECTIVE BOX
Patient is a 89y old  Female who presents with a chief complaint of respiratory distress (18 Sep 2020 12:59)        HPI:  : 89 y/o F with pmhx HTN, DM, GERD, parkinson's presents BIBA for respiratory distress. Pt's head is down and has contracted extremities. DNR but no DNI. Pt in severe respiratory distress and cannot provide history.     (17 Sep 2020 14:19)      SUBJECTIVE & OBJECTIVE: Pt seen and examined at bedside.     PHYSICAL EXAM:  T(C): 36.7 (20 @ 18:21), Max: 37.4 (20 @ 03:25)  HR: 61 (20 @ 18:21) (55 - 76)  BP: 105/50 (20 @ 18:21) (73/34 - 134/59)  RR: 18 (20 @ 18:21) (18 - 24)  SpO2: 98% (20 @ 18:21) (96% - 100%)  Wt(kg): --   Weight (kg): 67.2 ( @ 03:25)  I&O's Detail    17 Sep 2020 07:01  -  18 Sep 2020 07:00  --------------------------------------------------------  IN:    IV PiggyBack: 50 mL    IV PiggyBack: 400 mL    Jevity 1.2: 120 mL  Total IN: 570 mL    OUT:    Indwelling Catheter - Urethral (mL): 300 mL  Total OUT: 300 mL    Total NET: 270 mL        GENERAL: NAD, well-groomed, well-developed  HEAD:  Atraumatic, Normocephalic  EYES: EOMI, PERRLA, conjunctiva and sclera clear  ENMT: Moist mucous membranes  NECK: Supple, No JVD  NERVOUS SYSTEM:  Alert & Oriented X3, Motor Strength 5/5 B/L upper and lower extremities; DTRs 2+ intact and symmetric  CHEST/LUNG: Clear to auscultation bilaterally; No rales, rhonchi, wheezing, or rubs  HEART: Regular rate and rhythm; No murmurs, rubs, or gallops  ABDOMEN: Soft, Nontender, Nondistended; Bowel sounds present  EXTREMITIES:  2+ Peripheral Pulses, No clubbing, cyanosis, or edema    MEDICATIONS  (STANDING):  albuterol/ipratropium for Nebulization 3 milliLiter(s) Nebulizer every 6 hours  influenza   Vaccine 0.5 milliLiter(s) IntraMuscular once  latanoprost 0.005% Ophthalmic Solution 1 Drop(s) Both EYES at bedtime  morphine  Infusion 1 mG/Hr (1 mL/Hr) IV Continuous <Continuous>  pantoprazole  Injectable 40 milliGRAM(s) IV Push daily  piperacillin/tazobactam IVPB.. 3.375 Gram(s) IV Intermittent every 8 hours  scopolamine 1 mG/72 Hr(s) Patch 1 Patch Transdermal every 72 hours  valproate sodium IVPB 250 milliGRAM(s) IV Intermittent two times a day    MEDICATIONS  (PRN):  morphine  - Injectable 0.5 milliGRAM(s) IV Push every 1 hour PRN if RR >25, and/or air hunger      LABS:                        9.4    6.35  )-----------( 101      ( 18 Sep 2020 08:24 )             29.5     09-18    132<L>  |  99  |  43<H>  ----------------------------<  92  4.6   |  28  |  0.82    Ca    8.8      18 Sep 2020 08:24  Phos  3.4     -18    TPro  7.4  /  Alb  1.2<L>  /  TBili  0.4  /  DBili  x   /  AST  19  /  ALT  12  /  AlkPhos  84  -18    PT/INR - ( 18 Sep 2020 08:24 )   PT: 13.3 sec;   INR: 1.15 ratio         PTT - ( 17 Sep 2020 10:16 )  PTT:43.5 sec  Urinalysis Basic - ( 18 Sep 2020 00:18 )    Color: Yellow / Appearance: Slightly Turbid / S.005 / pH: x  Gluc: x / Ketone: Negative  / Bili: Negative / Urobili: 1 mg/dL   Blood: x / Protein: 100 mg/dL / Nitrite: Negative   Leuk Esterase: Moderate / RBC: 0-2 /HPF / WBC 11-25   Sq Epi: x / Non Sq Epi: Occasional / Bacteria: TNTC        CAPILLARY BLOOD GLUCOSE      POCT Blood Glucose.: 94 mg/dL (18 Sep 2020 19:32)  POCT Blood Glucose.: 142 mg/dL (18 Sep 2020 11:16)  POCT Blood Glucose.: 93 mg/dL (18 Sep 2020 06:22)  POCT Blood Glucose.: 113 mg/dL (18 Sep 2020 02:25)    ABG - ( 17 Sep 2020 11:10 )  pH, Arterial: x     pH, Blood: 7.32  /  pCO2: 50    /  pO2: 111   / HCO3: 25    / Base Excess: -0.8  /  SaO2: 98                CARDIAC MARKERS ( 18 Sep 2020 08:24 )  .020 ng/mL / x     / x     / x     / x      CARDIAC MARKERS ( 17 Sep 2020 10:16 )  <.015 ng/mL / x     / x     / x     / x            RECENT CULTURES:  Urine culture:   @ 15:05 --   No growth to date.      RADIOLOGY & ADDITIONAL TESTS:  Imaging Personally Reviewed:  [ ] YES  [ ] NO    Consultant(s) Notes Reviewed:  [ ] YES  [ ] NO    Care Discussed with Consultants/Other Providers [ ] YES  [ ] NO     HPI:  : 89 y/o F with pmhx HTN, DM, GERD, parkinson's presents BIBA for respiratory distress. Pt's head is down and has contracted extremities. DNR but no DNI. Pt in severe respiratory distress and cannot provide history.     (17 Sep 2020 14:19)      SUBJECTIVE & OBJECTIVE: Pt seen and examined at bedside.     PHYSICAL EXAM:  T(C): 36.7 (20 @ 18:21), Max: 37.4 (20 @ 03:25)  HR: 61 (20 @ 18:21) (55 - 76)  BP: 105/50 (20 @ 18:21) (73/34 - 134/59)  RR: 18 (20 @ 18:21) (18 - 24)  SpO2: 98% (20 @ 18:21) (96% - 100%)  Wt(kg): --   Weight (kg): 67.2 ( 03:25)  I&O's Detail    17 Sep 2020 07:01  -  18 Sep 2020 07:00  --------------------------------------------------------  IN:    IV PiggyBack: 50 mL    IV PiggyBack: 400 mL    Jevity 1.2: 120 mL  Total IN: 570 mL    OUT:    Indwelling Catheter - Urethral (mL): 300 mL  Total OUT: 300 mL    Total NET: 270 mL        GENERAL: nonverbal, contracted , in respiratory distress   HEAD:  Atraumatic, Normocephalic  NECK: Supple, No JVD  CHEST/LUNG: decreased BS throughout the left lung   HEART: Regular rate and rhythm; No murmurs, rubs, or gallops  ABDOMEN: Soft, Nontender, Nondistended; Bowel sounds present  EXTREMITIES:  No clubbing, cyanosis, or edema b/l     MEDICATIONS  (STANDING):  albuterol/ipratropium for Nebulization 3 milliLiter(s) Nebulizer every 6 hours  influenza   Vaccine 0.5 milliLiter(s) IntraMuscular once  latanoprost 0.005% Ophthalmic Solution 1 Drop(s) Both EYES at bedtime  morphine  Infusion 1 mG/Hr (1 mL/Hr) IV Continuous <Continuous>  pantoprazole  Injectable 40 milliGRAM(s) IV Push daily  piperacillin/tazobactam IVPB.. 3.375 Gram(s) IV Intermittent every 8 hours  scopolamine 1 mG/72 Hr(s) Patch 1 Patch Transdermal every 72 hours  valproate sodium IVPB 250 milliGRAM(s) IV Intermittent two times a day    MEDICATIONS  (PRN):  morphine  - Injectable 0.5 milliGRAM(s) IV Push every 1 hour PRN if RR >25, and/or air hunger      LABS:                        9.4    6.35  )-----------( 101      ( 18 Sep 2020 08:24 )             29.5     09-18    132<L>  |  99  |  43<H>  ----------------------------<  92  4.6   |  28  |  0.82    Ca    8.8      18 Sep 2020 08:24  Phos  3.4     18    TPro  7.4  /  Alb  1.2<L>  /  TBili  0.4  /  DBili  x   /  AST  19  /  ALT  12  /  AlkPhos  84  09-18    PT/INR - ( 18 Sep 2020 08:24 )   PT: 13.3 sec;   INR: 1.15 ratio         PTT - ( 17 Sep 2020 10:16 )  PTT:43.5 sec  Urinalysis Basic - ( 18 Sep 2020 00:18 )    Color: Yellow / Appearance: Slightly Turbid / S.005 / pH: x  Gluc: x / Ketone: Negative  / Bili: Negative / Urobili: 1 mg/dL   Blood: x / Protein: 100 mg/dL / Nitrite: Negative   Leuk Esterase: Moderate / RBC: 0-2 /HPF / WBC 11-25   Sq Epi: x / Non Sq Epi: Occasional / Bacteria: TNTC        CAPILLARY BLOOD GLUCOSE      POCT Blood Glucose.: 94 mg/dL (18 Sep 2020 19:32)  POCT Blood Glucose.: 142 mg/dL (18 Sep 2020 11:16)  POCT Blood Glucose.: 93 mg/dL (18 Sep 2020 06:22)  POCT Blood Glucose.: 113 mg/dL (18 Sep 2020 02:25)    ABG - ( 17 Sep 2020 11:10 )  pH, Arterial: x     pH, Blood: 7.32  /  pCO2: 50    /  pO2: 111   / HCO3: 25    / Base Excess: -0.8  /  SaO2: 98                CARDIAC MARKERS ( 18 Sep 2020 08:24 )  .020 ng/mL / x     / x     / x     / x      CARDIAC MARKERS ( 17 Sep 2020 10:16 )  <.015 ng/mL / x     / x     / x     / x            RECENT CULTURES:  Urine culture:   @ 15:05 --   No growth to date.      RADIOLOGY & ADDITIONAL TESTS:  Imaging Personally Reviewed:  [ ] YES  [ ] NO    Consultant(s) Notes Reviewed:  [x ] YES  [ ] NO    Care Discussed with Consultants/Other Providers [x ] YES  [ ] NO    Care discussed in detail with patient's son Harish .  All questions and concerns addressed     HPI:  : 89 y/o F with pmhx HTN, DM, GERD, parkinson's presents BIBA for respiratory distress. Pt's head is down and has contracted extremities. DNR but no DNI. Pt in severe respiratory distress and cannot provide history.     (17 Sep 2020 14:19)      SUBJECTIVE & OBJECTIVE: Pt seen and examined at bedside.     ROS unable to examine due to [ ] Encephalopathy  [x ] Advanced Dementia  [ ] Expressive Aphasia  [ ] Non-verbal patient     PHYSICAL EXAM:  T(C): 36.7 (20 @ 18:21), Max: 37.4 (20 @ 03:25)  HR: 61 (20 @ 18:21) (55 - 76)  BP: 105/50 (20 @ 18:21) (73/34 - 134/59)  RR: 18 (20 @ 18:21) (18 - 24)  SpO2: 98% (20 @ 18:21) (96% - 100%)  Wt(kg): --   Weight (kg): 67.2 ( @ 03:25)  I&O's Detail    17 Sep 2020 07:01  -  18 Sep 2020 07:00  --------------------------------------------------------  IN:    IV PiggyBack: 50 mL    IV PiggyBack: 400 mL    Jevity 1.2: 120 mL  Total IN: 570 mL    OUT:    Indwelling Catheter - Urethral (mL): 300 mL  Total OUT: 300 mL    Total NET: 270 mL        GENERAL: nonverbal, contracted , in respiratory distress   HEAD:  Atraumatic, Normocephalic  NECK: Supple, No JVD  CHEST/LUNG: decreased BS throughout the left lung   HEART: Regular rate and rhythm; No murmurs, rubs, or gallops  ABDOMEN: Soft, Nontender, Nondistended; Bowel sounds present  EXTREMITIES:  No clubbing, cyanosis, or edema b/l     MEDICATIONS  (STANDING):  albuterol/ipratropium for Nebulization 3 milliLiter(s) Nebulizer every 6 hours  influenza   Vaccine 0.5 milliLiter(s) IntraMuscular once  latanoprost 0.005% Ophthalmic Solution 1 Drop(s) Both EYES at bedtime  morphine  Infusion 1 mG/Hr (1 mL/Hr) IV Continuous <Continuous>  pantoprazole  Injectable 40 milliGRAM(s) IV Push daily  piperacillin/tazobactam IVPB.. 3.375 Gram(s) IV Intermittent every 8 hours  scopolamine 1 mG/72 Hr(s) Patch 1 Patch Transdermal every 72 hours  valproate sodium IVPB 250 milliGRAM(s) IV Intermittent two times a day    MEDICATIONS  (PRN):  morphine  - Injectable 0.5 milliGRAM(s) IV Push every 1 hour PRN if RR >25, and/or air hunger      LABS:                        9.4    6.35  )-----------( 101      ( 18 Sep 2020 08:24 )             29.5     -18    132<L>  |  99  |  43<H>  ----------------------------<  92  4.6   |  28  |  0.82    Ca    8.8      18 Sep 2020 08:24  Phos  3.4     18    TPro  7.4  /  Alb  1.2<L>  /  TBili  0.4  /  DBili  x   /  AST  19  /  ALT  12  /  AlkPhos  84  09-18    PT/INR - ( 18 Sep 2020 08:24 )   PT: 13.3 sec;   INR: 1.15 ratio         PTT - ( 17 Sep 2020 10:16 )  PTT:43.5 sec  Urinalysis Basic - ( 18 Sep 2020 00:18 )    Color: Yellow / Appearance: Slightly Turbid / S.005 / pH: x  Gluc: x / Ketone: Negative  / Bili: Negative / Urobili: 1 mg/dL   Blood: x / Protein: 100 mg/dL / Nitrite: Negative   Leuk Esterase: Moderate / RBC: 0-2 /HPF / WBC 11-25   Sq Epi: x / Non Sq Epi: Occasional / Bacteria: TNTC        CAPILLARY BLOOD GLUCOSE      POCT Blood Glucose.: 94 mg/dL (18 Sep 2020 19:32)  POCT Blood Glucose.: 142 mg/dL (18 Sep 2020 11:16)  POCT Blood Glucose.: 93 mg/dL (18 Sep 2020 06:22)  POCT Blood Glucose.: 113 mg/dL (18 Sep 2020 02:25)    ABG - ( 17 Sep 2020 11:10 )  pH, Arterial: x     pH, Blood: 7.32  /  pCO2: 50    /  pO2: 111   / HCO3: 25    / Base Excess: -0.8  /  SaO2: 98                CARDIAC MARKERS ( 18 Sep 2020 08:24 )  .020 ng/mL / x     / x     / x     / x      CARDIAC MARKERS ( 17 Sep 2020 10:16 )  <.015 ng/mL / x     / x     / x     / x            RECENT CULTURES:  Urine culture:   @ 15:05 --   No growth to date.      RADIOLOGY & ADDITIONAL TESTS:  Imaging Personally Reviewed:  [ ] YES  [ ] NO    Consultant(s) Notes Reviewed:  [x ] YES  [ ] NO    Care Discussed with Consultants/Other Providers [x ] YES  [ ] NO    Care discussed in detail with patient's son Harish .  All questions and concerns addressed

## 2020-09-18 NOTE — CONSULT NOTE ADULT - SUBJECTIVE AND OBJECTIVE BOX
HPI:  : 89 y/o F with pmhx HTN, DM, GERD, parkinson's presents BIBA for respiratory distress. Pt's head is down and has contracted extremities. DNR but no DNI. Pt in severe respiratory distress and cannot provide history.     (17 Sep 2020 14:19)    PERTINENT PM/SXH:   Parkinson disease    DM (diabetes mellitus)    HTN (hypertension)    Glaucoma        FAMILY HISTORY:      SOCIAL HISTORY:   Significant other/partner: Yes [ ]  No [ x] Children:  Yes [x ]  No [ ] Congregational/Spirituality: Nondenominational  Substance hx: Yes[ ]  No [ ]   Tobacco hx:  Yes [ ] No [ ]   Alcohol hx: Yes [ ] No [ ]   Home Opioid hx:  Yes [ ] No [ x]  [ ] I-Stop Reference No:[id number]  Living Situation: [x ]Home  [ ]Long term care  [ ]Rehab [ ]Other  lives with dtr    ADVANCE DIRECTIVES:    DNR  Yes    Yes    MOLST  Yes [x ] No [ ]  Living Will  Yes [ ]  No [ ]     [ x] Health Care Proxy(s)  [ ] Surrogate(s)  [ ] Guardian           Name(s): Phone Number(s):  Kelsie (daughter and HCP) [phone]    BASELINE (I)ADL(s) (prior to admission):  [place]: [ ]Total  [ ] Moderate [ X]Dependent    Allergies    No Known Allergies    Intolerances    MEDICATIONS  (STANDING):  albuterol/ipratropium for Nebulization 3 milliLiter(s) Nebulizer every 6 hours  heparin   Injectable 5000 Unit(s) SubCutaneous every 12 hours  influenza   Vaccine 0.5 milliLiter(s) IntraMuscular once  latanoprost 0.005% Ophthalmic Solution 1 Drop(s) Both EYES at bedtime  pantoprazole  Injectable 40 milliGRAM(s) IV Push daily  piperacillin/tazobactam IVPB.. 3.375 Gram(s) IV Intermittent every 8 hours  valproate sodium IVPB 250 milliGRAM(s) IV Intermittent two times a day    MEDICATIONS  (PRN):  morphine  - Injectable 0.5 milliGRAM(s) IV Push every 4 hours PRN signs of discomfort or SOB/Respiratory distress    PRESENT SYMPTOMS: [ X]Unable to obtain due to poor mentation   Source if other than patient:  [ ]Family   [ ]Team     Pain (Impact on QOL):    Location -   Severity -        Minimal acceptable level (0-10 scale):  Quality:   Onset:   Duration:                 Aggravating factors -  Relieving factors -  Radiation -    PAIN AD Score: 0    [email or web address] (press ctrl +  left click to view)    Dyspnea:  Yes [ ] No [ ] - [ ]Mild [ ]Moderate [ ]Severe  Anxiety:    Yes [ ] No [ ] - [ ]Mild [ ]Moderate [ ]Severe  Fatigue:    Yes [ ] No [ ] - [ ]Mild [ ]Moderate [ ]Severe  Nausea:    Yes [ ] No [ ] - [ ]Mild [ ]Moderate [ ]Severe                         Loss of appetite: Yes [ ] No [ ] - [ ]Mild [ ]Moderate [ ]Severe             Constipation:  Yes [ ] No [ ] - [ ]Mild [ ]Moderate [ ]Severe  Grief: Yes [ ] No [ ]     Other Symptoms:  [ ]All other review of systems negative     Karnofsky Performance Score/Palliative Performance Status Version 2:     10    %    http://palliative.info/resource_material/[email or web address].pdf    PHYSICAL EXAM:  Vital Signs Last 24 Hrs  T(C): 36.9 (18 Sep 2020 10:37), Max: 37.4 (18 Sep 2020 03:25)  T(F): 98.4 (18 Sep 2020 10:37), Max: 99.4 (18 Sep 2020 03:25)  HR: 70 (18 Sep 2020 11:36) (46 - 88)  BP: 134/59 (18 Sep 2020 11:36) (73/34 - 143/67)  BP(mean): 80 (18 Sep 2020 10:37) (73 - 80)  RR: 20 (18 Sep 2020 11:36) (16 - 29)  SpO2: 100% (18 Sep 2020 11:36) (96% - 100%) I&O's Summary    17 Sep 2020 07:01  -  18 Sep 2020 07:00  --------------------------------------------------------  IN: 570 mL / OUT: 300 mL / NET: 270 mL        GENERAL:  [ ]Alert  [ ]Oriented x   [ X]Lethargic  [ ]Cachexia  [ ]Unarousable  [ ]Verbal    [X ]Non-Verbal  Behavioral:   [ ] Anxiety  [ ] Delirium [ ] Agitation [X ] Other End stage parkinsons  HEENT:  [ ]Normal   [ x]Dry mouth   [ ]ET Tube/Trach  [ ]Oral lesions  PULMONARY:   [ ]Clear  [ ]Tachypnea  [ ]Audible excessive secretions   [x ]Rhonchi        [ ]Right [ ]Left [x ]Bilateral  [ ]Crackles        [ ]Right [ ]Left [ ]Bilateral  [ ]Wheezing     [ ]Right [ ]Left [ ]Bilateral  CARDIOVASCULAR:    [x ]Regular [ ]Irregular [ ]Tachy  [ ]Desmond [ ]Murmur [ ]Other  GASTROINTESTINAL:  [ x]Soft  [ ]Distended   [x ]+BS  [ ]Non tender [ ]Tender  [x ]PEG [ ]OGT/ NGT  Last BM:     GENITOURINARY:  [ ]Normal [ ] Incontinent   [ ]Oliguria/Anuria   [ x]Angel  MUSCULOSKELETAL:   [ ]Normal   [ ]Weakness  [ ]Bed/Wheelchair bound [ ]Edema  NEUROLOGIC:   [ ]No focal deficits  [x ] Cognitive impairment  [ ] Dysphagia [ ]Dysarthria [ ] Paresis [ ]Other   SKIN:   [ ]Normal   [ x]Pressure ulcer(s)  [ ]Rash    LABS:                        9.4    6.35  )-----------( 101      ( 18 Sep 2020 08:24 )             29.5   09-18    132<L>  |  99  |  43<H>  ----------------------------<  92  4.6   |  28  |  0.82    Ca    8.8      18 Sep 2020 08:24  Phos  3.4     09-18    TPro  7.4  /  Alb  1.2<L>  /  TBili  0.4  /  DBili  x   /  AST  19  /  ALT  12  /  AlkPhos  84  09-18  PT/INR - ( 18 Sep 2020 08:24 )   PT: 13.3 sec;   INR: 1.15 ratio         PTT - ( 17 Sep 2020 10:16 )  PTT:43.5 sec    Urinalysis Basic - ( 18 Sep 2020 00:18 )    Color: Yellow / Appearance: Slightly Turbid / S.005 / pH: x  Gluc: x / Ketone: Negative  / Bili: Negative / Urobili: 1 mg/dL   Blood: x / Protein: 100 mg/dL / Nitrite: Negative   Leuk Esterase: Moderate / RBC: 0-2 /HPF / WBC 11-25   Sq Epi: x / Non Sq Epi: Occasional / Bacteria: TNTC      RADIOLOGY & ADDITIONAL STUDIES:  < from: Xray Chest 1 View-PORTABLE IMMEDIATE (20 @ 10:38) >    EXAM:  XR CHEST PORTABLE IMMED 1V                            PROCEDURE DATE:  2020          INTERPRETATION:  History: Sepsis    Portable radiograph of the chest is performed. comparison is made to 2007.    Patient is contracted with armspartially obscuring the lung fields. The heart size is borderline. Right lung is clear. There is questionable infiltrate or atelectasis at the left lung base.    Impression: Limited by positioning. Questionable infiltrate at the left lung base        < end of copied text >    PROTEIN CALORIE MALNUTRITION PRESENT: [ x] Yes [ ] No  [ x] PPSV2 < or = to 30% [ ] significant weight loss  [ ] poor nutritional intake [ ] catabolic state [ ] anasarca     Albumin, Serum: 1.2 g/dL (20 @ 08:24)      REFERRALS:   [ ]Chaplaincy  [x ] Hospice  [ ]Child Life  [x ]Social Work  [ ]Case management [ ]Holistic Therapy   Goals of Care Discussion Document: GHASSAN in chart.

## 2020-09-18 NOTE — DIETITIAN INITIAL EVALUATION ADULT. - OTHER INFO
Pt seen, not receiving enteral feeding, as feeding coming out of the mouth (As per RN). Pt is NPO at present, actively dying.  Severe protein-calorie malnutrition noted by palliative care NP.   Pt is DNR, DNI for comfort care. Hospice care referral sent.

## 2020-09-18 NOTE — CONSULT NOTE ADULT - PROBLEM SELECTOR RECOMMENDATION 10
[Alert] : alert [No Acute Distress] : no acute distress [Normocephalic] : normocephalic [Flat Open Anterior Buckner] : flat open anterior fontanelle [Red Reflex Bilateral] : red reflex bilateral [Normally Placed Ears] : normally placed ears [PERRL] : PERRL Pt remains a DNR/DNI.   Contact: Kelsie (daughter and HCP) [phone]  Family interested in hospice services if patient declines, [facility] referral placed by RAZA. For now, the family wants to see if their mother improves with current IV abx use.   Will continue to follow for supportive care and symptom management.  Please page [phone] with any questions or concerns. [Auricles Well Formed] : auricles well formed [Nares Patent] : nares patent [No Discharge] : no discharge [Clear Tympanic membranes with present light reflex and bony landmarks] : clear tympanic membranes with present light reflex and bony landmarks [Palate Intact] : palate intact [Uvula Midline] : uvula midline [No Palpable Masses] : no palpable masses [Tooth Eruption] : tooth eruption  [Supple, full passive range of motion] : supple, full passive range of motion [Symmetric Chest Rise] : symmetric chest rise [Regular Rate and Rhythm] : regular rate and rhythm [Clear to Auscultation Bilaterally] : clear to auscultation bilaterally [No Murmurs] : no murmurs [S1, S2 present] : S1, S2 present [Soft] : soft [+2 Femoral Pulses] : +2 femoral pulses [NonTender] : non tender Pt remains a DNR/DNI.   Contact: Kelsie (daughter and HCP)[phone]  Harish [phone]  Family interested in hospice services if patient declines, [facility] referral placed by RAZA. For now, the family wants to see if their mother improves with current IV abx use.   Will continue to follow for supportive care and symptom management.  Please page [phone] with any questions or concerns. [No Hepatomegaly] : no hepatomegaly [Normoactive Bowel Sounds] : normoactive bowel sounds [Non Distended] : non distended [No Splenomegaly] : no splenomegaly [Luis 1] : Luis 1 [Normal Vaginal Introitus] : normal vaginal introitus [No Clitoromegaly] : no clitoromegaly [No Abnormal Lymph Nodes Palpated] : no abnormal lymph nodes palpated [No Clavicular Crepitus] : no clavicular crepitus [Negative Khan-Ortalani] : negative Khan-Ortalani [Symmetric Buttocks Creases] : symmetric buttocks creases [No Spinal Dimple] : no spinal dimple [NoTuft of Hair] : no tuft of hair [Cranial Nerves Grossly Intact] : cranial nerves grossly intact [No Rash or Lesions] : no rash or lesions

## 2020-09-18 NOTE — PATIENT PROFILE ADULT - NSPROEXTENSIONSOFSELF_GEN_A_NUR
Anesthesia POST Procedure Evaluation    Patient: Dorita Gilmore   MRN:     [id number] Gender:   female   Age:    7 year old :      2012        Preoperative Diagnosis: B-cell acute lymphoblastic leukemia   Procedure(s):  Port removal   Postop Comments: No value filed.       Anesthesia Type:  General  General    Reportable Event: NO     PAIN: Uncomplicated   Sign Out status: Comfortable, Well controlled pain     PONV: No PONV   Sign Out status:  No Nausea or Vomiting     Neuro/Psych: Uneventful perioperative course   Sign Out Status: Preoperative baseline; Age appropriate mentation     Airway/Resp.: Uneventful perioperative course   Sign Out Status: Non labored breathing, age appropriate RR; Resp. Status within EXPECTED Parameters     CV: Uneventful perioperative course   Sign Out status: Appropriate BP and perfusion indices; Appropriate HR/Rhythm     Disposition:   Sign Out in:  Peds sedation  Recovery Course: Uneventful  Follow-Up: Not required           Last Anesthesia Record Vitals:  CRNA VITALS  2019 1349 - 2019 1440      2019             Pulse:  97    SpO2:  99 %          Last PACU Vitals:  Vitals Value Taken Time   BP 92/50 2019  2:30 PM   Temp     Pulse 87 2019  2:30 PM   Resp 25 2019  2:39 PM   SpO2 99 % 2019  2:39 PM   Temp src     NIBP     Pulse     SpO2     Resp     Temp     Ht Rate     Temp 2     Vitals shown include unvalidated device data.      Electronically Signed By: Kamar Rm MD, May 28, 2019, 2:40 PM  
Anesthesia POST Procedure Evaluation    Patient: Dorita Gilmore   MRN:     [id number] Gender:   female   Age:    7 year old :      2012        Preoperative Diagnosis: B-cell acute lymphoblastic leukemia   Procedure(s):  Port removal   Postop Comments: No value filed.       Anesthesia Type:  General  General    Reportable Event: NO     PAIN: Uncomplicated   Sign Out status: Comfortable, Well controlled pain     PONV: No PONV   Sign Out status:  No Nausea or Vomiting     Neuro/Psych: Uneventful perioperative course   Sign Out Status: Preoperative baseline; Age appropriate mentation     Airway/Resp.: Uneventful perioperative course   Sign Out Status: Airway Device present     CV: Uneventful perioperative course   Sign Out status: Appropriate BP and perfusion indices; Appropriate HR/Rhythm     Disposition:   Sign Out in:  Peds sedation  Recovery Course: Uneventful  Follow-Up: Not required           Last Anesthesia Record Vitals:  CRNA VITALS  2019 1324 - 2019 1354      2019             Pulse:  96    Ht Rate:  96    SpO2:  100 %          Last PACU Vitals:  Vitals Value Taken Time   BP     Temp     Pulse     Resp     SpO2     Temp src     NIBP 93/46 2019  1:51 PM   Pulse 96 2019  1:54 PM   SpO2 100 % 2019  1:54 PM   Resp     Temp     Ht Rate 96 2019  1:54 PM   Temp 2           Electronically Signed By: Kamar Rm MD, May 28, 2019, 1:54 PM  
none

## 2020-09-18 NOTE — PROGRESS NOTE ADULT - SUBJECTIVE AND OBJECTIVE BOX
MONTEZ AYALAVERENICE    [facility] [address]    Patient is a 89y old  Female who presents with a chief complaint of respiratory distress (17 Sep 2020 15:39)       Allergies    No Known Allergies    Intolerances        HPI:  : 89 y/o F with pmhx HTN, DM, GERD, parkinson's presents BIBA for respiratory distress. Pt's head is down and has contracted extremities. DNR but no DNI. Pt in severe respiratory distress and cannot provide history.     (17 Sep 2020 14:19)      PAST MEDICAL & SURGICAL HISTORY:  Parkinson disease    DM (diabetes mellitus)    HTN (hypertension)    Glaucoma        FAMILY HISTORY:        MEDICATIONS   albuterol/ipratropium for Nebulization 3 milliLiter(s) Nebulizer every 6 hours  heparin   Injectable 5000 Unit(s) SubCutaneous every 12 hours  latanoprost 0.005% Ophthalmic Solution 1 Drop(s) Both EYES at bedtime  morphine  - Injectable 0.5 milliGRAM(s) IV Push every 4 hours PRN  pantoprazole  Injectable 40 milliGRAM(s) IV Push daily  piperacillin/tazobactam IVPB.. 3.375 Gram(s) IV Intermittent every 8 hours  valproate sodium IVPB 250 milliGRAM(s) IV Intermittent two times a day      Vital Signs Last 24 Hrs  T(C): 37.1 (18 Sep 2020 05:25), Max: 37.4 (18 Sep 2020 03:25)  T(F): 98.8 (18 Sep 2020 05:25), Max: 99.4 (18 Sep 2020 03:25)  HR: 60 (18 Sep 2020 05:41) (46 - 88)  BP: 96/38 (18 Sep 2020 05:25) (73/34 - 151/86)  BP(mean): 73 (17 Sep 2020 17:50) (73 - 73)  RR: 20 (18 Sep 2020 05:25) (16 - 29)  SpO2: 96% (18 Sep 2020 05:41) (96% - 100%)      20 @ 07:01  -  20 @ 07:00  --------------------------------------------------------  IN: 570 mL / OUT: 300 mL / NET: 270 mL            LABS:                        9.4    6.35  )-----------( 101      ( 18 Sep 2020 08:24 )             29.5         134<L>  |  100  |  33<H>  ----------------------------<  75  4.1   |  28  |  0.41<L>    Ca    9.5      17 Sep 2020 10:16    TPro  8.1  /  Alb  1.4<L>  /  TBili  0.3  /  DBili  x   /  AST  27  /  ALT  12  /  AlkPhos  103      PT/INR - ( 18 Sep 2020 08:24 )   PT: 13.3 sec;   INR: 1.15 ratio         PTT - ( 17 Sep 2020 10:16 )  PTT:43.5 sec  Urinalysis Basic - ( 18 Sep 2020 00:18 )    Color: Yellow / Appearance: Slightly Turbid / S.005 / pH: x  Gluc: x / Ketone: Negative  / Bili: Negative / Urobili: 1 mg/dL   Blood: x / Protein: 100 mg/dL / Nitrite: Negative   Leuk Esterase: Moderate / RBC: 0-2 /HPF / WBC 11-25   Sq Epi: x / Non Sq Epi: Occasional / Bacteria: TNTC        ABG - ( 17 Sep 2020 11:10 )  pH, Arterial: x     pH, Blood: 7.32  /  pCO2: 50    /  pO2: 111   / HCO3: 25    / Base Excess: -0.8  /  SaO2: 98                  WBC:  WBC Count: 6.35 K/uL ( @ 08:24)  WBC Count: 6.95 K/uL ( @ 10:16)      MICROBIOLOGY:  RECENT CULTURES:        CARDIAC MARKERS ( 17 Sep 2020 10:16 )  <.015 ng/mL / x     / x     / x     / x            PT/INR - ( 18 Sep 2020 08:24 )   PT: 13.3 sec;   INR: 1.15 ratio         PTT - ( 17 Sep 2020 10:16 )  PTT:43.5 sec    Sodium:  Sodium, Serum: 134 mmol/L ( @ 10:16)      0.41 mg/dL  @ 10:16      Hemoglobin:  Hemoglobin: 9.4 g/dL ( @ 08:24)  Hemoglobin: 10.4 g/dL ( @ 10:16)      Platelets: Platelet Count - Automated: 101 K/uL ( @ 08:24)  Platelet Count - Automated: 103 K/uL ( @ 10:16)      LIVER FUNCTIONS - ( 17 Sep 2020 10:16 )  Alb: 1.4 g/dL / Pro: 8.1 gm/dL / ALK PHOS: 103 U/L / ALT: 12 U/L / AST: 27 U/L / GGT: x             Urinalysis Basic - ( 18 Sep 2020 00:18 )    Color: Yellow / Appearance: Slightly Turbid / S.005 / pH: x  Gluc: x / Ketone: Negative  / Bili: Negative / Urobili: 1 mg/dL   Blood: x / Protein: 100 mg/dL / Nitrite: Negative   Leuk Esterase: Moderate / RBC: 0-2 /HPF / WBC 11-25   Sq Epi: x / Non Sq Epi: Occasional / Bacteria: TNTC        RADIOLOGY & ADDITIONAL STUDIES:

## 2020-09-18 NOTE — CONSULT NOTE ADULT - PROBLEM SELECTOR RECOMMENDATION 5
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   Recommend to Pre-medicate with PRN pain medications before any wound care to prevent distress.

## 2020-09-18 NOTE — CONSULT NOTE ADULT - PROBLEM SELECTOR RECOMMENDATION 2
VITAL SIGNS    Telemetry:    Vital Signs Last 24 Hrs  T(C): 36.7 (20 @ 04:41), Max: 36.7 (20 @ 19:40)  T(F): 98.1 (20 @ 04:41), Max: 98.1 (20 @ 19:40)  HR: 104 (20 @ 04:41) (87 - 108)  BP: 113/78 (20 @ 04:41) (106/72 - 113/78)  RR: 18 (20 @ 04:41) (18 - 18)  SpO2: 92% (20 @ 04:41) (92% - 95%)             @ 07:01  -   @ 07:00  --------------------------------------------------------  IN: 1960 mL / OUT: 830 mL / NET: 1130 mL     @ 07:01  -   @ 11:14  --------------------------------------------------------  IN: 360 mL / OUT: 0 mL / NET: 360 mL       Daily     Daily Weight in k.8 (2020 08:47)  Admit Wt: Drug Dosing Weight  Height (cm): 165.1 (2020 07:03)  Weight (kg): 91.1 (2020 09:00)  BMI (kg/m2): 33.4 (2020 09:00)  BSA (m2): 1.98 (2020 09:00)      CAPILLARY BLOOD GLUCOSE              acetaminophen   Tablet .. 650 milliGRAM(s) Oral every 6 hours PRN  aMIOdarone    Tablet 400 milliGRAM(s) Oral every 8 hours  aspirin enteric coated 325 milliGRAM(s) Oral daily  ATENolol  Tablet 100 milliGRAM(s) Oral every 12 hours  atorvastatin 40 milliGRAM(s) Oral at bedtime  chlorhexidine 2% Cloths 1 Application(s) Topical <User Schedule>  cyclobenzaprine 10 milliGRAM(s) Oral three times a day PRN  DULoxetine 60 milliGRAM(s) Oral two times a day  enoxaparin Injectable 40 milliGRAM(s) SubCutaneous two times a day  fentaNYL   Patch  25 MICROgram(s)/Hr 1 Patch Transdermal every 72 hours  lamoTRIgine 150 milliGRAM(s) Oral every 12 hours  levothyroxine 150 MICROGram(s) Oral daily  LORazepam     Tablet 0.5 milliGRAM(s) Oral every 8 hours  ondansetron Injectable 4 milliGRAM(s) IV Push every 6 hours PRN  oxyCODONE    IR 10 milliGRAM(s) Oral every 4 hours PRN  pantoprazole    Tablet 40 milliGRAM(s) Oral before breakfast  polyethylene glycol 3350 17 Gram(s) Oral daily  sodium chloride 0.9% lock flush 3 milliLiter(s) IV Push every 8 hours  sorbitol 70% Solution 30 milliLiter(s) Oral once      PHYSICAL EXAM    Subjective: "Hi.   Neurology: alert and oriented x 3, nonfocal, no gross deficits  CV : tele:  RSR  Sternal Wound :  CDI with dressing , Stable  Lungs: clear. RR easy, unlabored   Abdomen: soft, nontender, nondistended, positive bowel sounds, bowel movement   Neg N/V/D   :  pt voiding without difficulty   Extremities:   OLSON; edema, neg calf tenderness.   PPP bilaterally      PW:  Chest tubes: Pt with PMHx of Parkinson's. Baseline pt is non-verbal and contracted. As per the pt's son, Harish, she occasionally will yell out a word or two if she wants something or is uncomfortable. VITAL SIGNS    Telemetry:  afib 90's since  1142   Vital Signs Last 24 Hrs  T(C): 36.7 (20 @ 04:41), Max: 36.7 (20 @ 19:40)  T(F): 98.1 (20 @ 04:41), Max: 98.1 (20 @ 19:40)  HR: 104 (20 @ 04:41) (87 - 108)  BP: 113/78 (20 @ 04:41) (106/72 - 113/78)  RR: 18 (20 @ 04:41) (18 - 18)  SpO2: 92% (20 @ 04:41) (92% - 95%)             @ 07:01  -   @ 07:00  --------------------------------------------------------  IN: 1960 mL / OUT: 830 mL / NET: 1130 mL     @ 07:01  -   @ 11:14  --------------------------------------------------------  IN: 360 mL / OUT: 0 mL / NET: 360 mL       Daily     Daily Weight in k.8 (2020 08:47)  Admit Wt: Drug Dosing Weight  Height (cm): 165.1 (2020 07:03)  Weight (kg): 91.1 (2020 09:00)  BMI (kg/m2): 33.4 (2020 09:00)  BSA (m2): 1.98 (2020 09:00)      CAPILLARY BLOOD GLUCOSE              acetaminophen   Tablet .. 650 milliGRAM(s) Oral every 6 hours PRN  aMIOdarone    Tablet 400 milliGRAM(s) Oral every 8 hours  aspirin enteric coated 325 milliGRAM(s) Oral daily  ATENolol  Tablet 100 milliGRAM(s) Oral every 12 hours  atorvastatin 40 milliGRAM(s) Oral at bedtime  chlorhexidine 2% Cloths 1 Application(s) Topical <User Schedule>  cyclobenzaprine 10 milliGRAM(s) Oral three times a day PRN  DULoxetine 60 milliGRAM(s) Oral two times a day  enoxaparin Injectable 40 milliGRAM(s) SubCutaneous two times a day  fentaNYL   Patch  25 MICROgram(s)/Hr 1 Patch Transdermal every 72 hours  lamoTRIgine 150 milliGRAM(s) Oral every 12 hours  levothyroxine 150 MICROGram(s) Oral daily  LORazepam     Tablet 0.5 milliGRAM(s) Oral every 8 hours  ondansetron Injectable 4 milliGRAM(s) IV Push every 6 hours PRN  oxyCODONE    IR 10 milliGRAM(s) Oral every 4 hours PRN  pantoprazole    Tablet 40 milliGRAM(s) Oral before breakfast  polyethylene glycol 3350 17 Gram(s) Oral daily  sodium chloride 0.9% lock flush 3 milliLiter(s) IV Push every 8 hours  sorbitol 70% Solution 30 milliLiter(s) Oral once      PHYSICAL EXAM    Subjective: "I feel ok."  Neurology: alert and oriented x 3, nonfocal, no gross deficits  CV : tele:  afib 90's since  1142   Sternal Wound :  CDI LISANDRO - sternum stable   Lungs: clear. RR easy, unlabored   Abdomen: soft, nontender, nondistended, positive bowel sounds, + bowel movement   Neg N/V/D; obese abdomen   :  pt voiding without difficulty   Extremities:   OLSON; neg LE edema, neg calf tenderness.   PPP bilaterally      PW: no  Chest tubes: none

## 2020-09-18 NOTE — DIETITIAN INITIAL EVALUATION ADULT. - PERTINENT MEDS FT
MEDICATIONS  (STANDING):  albuterol/ipratropium for Nebulization 3 milliLiter(s) Nebulizer every 6 hours  heparin   Injectable 5000 Unit(s) SubCutaneous every 12 hours  influenza   Vaccine 0.5 milliLiter(s) IntraMuscular once  latanoprost 0.005% Ophthalmic Solution 1 Drop(s) Both EYES at bedtime  morphine  Infusion 1 mG/Hr (1 mL/Hr) IV Continuous <Continuous>  pantoprazole  Injectable 40 milliGRAM(s) IV Push daily  piperacillin/tazobactam IVPB.. 3.375 Gram(s) IV Intermittent every 8 hours  scopolamine 1 mG/72 Hr(s) Patch 1 Patch Transdermal every 72 hours  valproate sodium IVPB 250 milliGRAM(s) IV Intermittent two times a day    MEDICATIONS  (PRN):  morphine  - Injectable 0.5 milliGRAM(s) IV Push every 1 hour PRN if RR >25, and/or air hunger

## 2020-09-18 NOTE — CONSULT NOTE ADULT - PROBLEM SELECTOR RECOMMENDATION 7
Pt with PMHx of HTN. Recommend to continue to monitor BPs. No BP medications started in hospital d/t hypotension.

## 2020-09-18 NOTE — DIETITIAN INITIAL EVALUATION ADULT. - PHYSICAL APPEARANCE
underweight/debilitated/emaciated/other (specify)/BMI 23.9 (09/18), no edema noted, Ht: 5'6", Wt: 67.2kg/148.2#, question accuracy of ht/wt (pt appears underweight) Pt with severely depleted orbital, temporal, and buccal areas. Pt with visible severely depleted orbital, temporal, and buccal areas.

## 2020-09-18 NOTE — DIETITIAN INITIAL EVALUATION ADULT. - PERTINENT LABORATORY DATA
09-18 @ 08:24: Na 132<L>, BUN 43<H>, Cr 0.82, BG 92, K+ 4.6, Phos 3.4, Mg --, Alk Phos 84, ALT/SGPT 12, AST/SGOT 19, HbA1c --

## 2020-09-18 NOTE — CONSULT NOTE ADULT - ATTENDING COMMENTS
I have seen and evaluated this patient independently and with LOAN Wagoner and agree with the above findings. 89 year old end stage Parkinson's dementia, bedbound x 10 years, extensive contractures of extremities as well pressure ulcers, fed via PEG admitted for  placed on BIPAP for respiratory distress. Use of BIPAP in a pt as such is contraindicated and may even be harmful as she has poor mentation and inability to remove mask should any issues arise. Met son Harish and daughter Kelsie with our  and  NP Ciaran this morning, we discussed care goals as well as avenues of care such as comfort and hospice setting. They ultimately wish to take their mother home as she has live in aides who care for her. Explained that we would need to see how their mother does clinically after removing BIPAP and assessing her overall status, if she remained symptomatic she would need to continue to be cared for in the hospital but if she stabilized we could entertain getting her home, preferably on hospice program. Kelsie volunteers for a hospice and understands a lot about the programs benefits. Currently will continue morphine for air hunger/work of breathing and start scopolamine patch. Hospice referral made. Will follow up. Total attending time 70min

## 2020-09-19 NOTE — PROGRESS NOTE ADULT - ASSESSMENT
pt on comfort oriented management   cont rx   pt on comfort oriented management   cont rx      IRMA HERRMANN [phone]  7/15/1930 [address]A 2020 DR RON TEJEDA     REVIEW OF SYMPTOMS      Able to give ROS  NO     PHYSICAL EXAM    HEENT Unremarkable PERRLA atraumatic   RESP Fair air entry EXP prolonged    Harsh breath sound Resp distres mild   CARDIAC S1 S2 No S3     NO JVD    ABDOMEN SOFT BS PRESENT NOT DISTENDED No hepatosplenomegaly PEDAL EDEMA present No calf tenderness  NO rash   GENERAL Not TOXIC looking    PT DATA/BEST PRACTICE  ALLERGY   nka                  WT               2020 79                       BMI               2020 28                             ADVANCED DIRECTIVE  dnr   LINES TUBES POA.  PROCEDURES.              HEAD OF BED ELEVATION Yes      DVT PROPHYLAXIS.   hpsc ()      DYSPHAGIA EVAL .    DIET..   npo ()                                                                         IV F.  WILLIS PROPHYLAXIS. protonix 40 ()   INFECTION PPLX                                                     OXYGENATION        VITALS/LABS       2020 afeb 50 100/30       PATIENT SUMMARY.   89 y/o F with pmhx HTN, DM, GERD, parkinson's presents BIBA for respiratory distress  Pulmonary consulted 2020       PROBLEM/ASSESSMENT/RECOMMENDATIONS.  DYSPNEA   Possibly sec asp pneu  HYPERCAPNIC RESP FAILURE   ABG 2020 50% 732/50/111   Pt seen by Palliative   Pt considered to be too risky for bpap use as he is lethargic   O2 to keep po 90-95%  COPD   duoneb  PNEUMONIA      2020 Pt had elevated pr (0.47 on 2020) l base infiltr His leg and mrsa were neg   So cont zosyn ()     COVID STATUS   2020 covid naive   [facility]   Hospice being considered        TIME SPENT   Over 25 minutes aggregate care time spent on encounter; activities included   direct patient care, counseling and/or coordinating care reviewing notes, lab data/ imaging , discussion with multidisciplinary team/ patient  /family and explaining in detail risks, benefits, alternatives  of the recommendations     IRMA HERRMANN [phone]  7/15/1930 DOA 2020 DR RON TEJEDA

## 2020-09-19 NOTE — PROGRESS NOTE ADULT - ASSESSMENT
89 female with parkingsons with acute respitratory failure and sepsis requiring barehugger - rejected by icu - patient is dnr dni     patient accepted to inpatient hospice.        89 female with parkingsons with acute respitratory failure and sepsis requiring barehugger - rejected by icu - patient is dnr dni     patient accepted to inpatient hospice.   comfort care measures   family OK with abx and other supportive measures   discussed with family   poor prognosis

## 2020-09-19 NOTE — PROGRESS NOTE ADULT - PROBLEM SELECTOR PLAN 4
Vascular consult for possible debridement Vascular consult for possible debridement, however patient not a good candidate for debridement at this time. patient awaiting hospice

## 2020-09-19 NOTE — PROGRESS NOTE ADULT - SUBJECTIVE AND OBJECTIVE BOX
MONTEZ IRMA    [facility] [address]    Patient is a 89y old  Female who presents with a chief complaint of respiratory distress (18 Sep 2020 23:19)       Allergies    No Known Allergies    Intolerances        HPI:  : 89 y/o F with pmhx HTN, DM, GERD, parkinson's presents BIBA for respiratory distress. Pt's head is down and has contracted extremities. DNR but no DNI. Pt in severe respiratory distress and cannot provide history.     (17 Sep 2020 14:19)      PAST MEDICAL & SURGICAL HISTORY:  Parkinson disease    DM (diabetes mellitus)    HTN (hypertension)    Glaucoma        FAMILY HISTORY:        MEDICATIONS   albuterol/ipratropium for Nebulization 3 milliLiter(s) Nebulizer every 6 hours  furosemide   Injectable 40 milliGRAM(s) IV Push daily  heparin   Injectable 5000 Unit(s) SubCutaneous every 12 hours  influenza   Vaccine 0.5 milliLiter(s) IntraMuscular once  latanoprost 0.005% Ophthalmic Solution 1 Drop(s) Both EYES at bedtime  morphine  - Injectable 0.5 milliGRAM(s) IV Push every 1 hour PRN  morphine  Infusion 1 mG/Hr IV Continuous <Continuous>  pantoprazole  Injectable 40 milliGRAM(s) IV Push daily  piperacillin/tazobactam IVPB.. 3.375 Gram(s) IV Intermittent every 8 hours  scopolamine 1 mG/72 Hr(s) Patch 1 Patch Transdermal every 72 hours  valproate sodium IVPB 250 milliGRAM(s) IV Intermittent two times a day      Vital Signs Last 24 Hrs  T(C): 35.9 (19 Sep 2020 05:31), Max: 36.9 (18 Sep 2020 10:37)  T(F): 96.6 (19 Sep 2020 05:31), Max: 98.4 (18 Sep 2020 10:37)  HR: 51 (19 Sep 2020 05:31) (51 - 74)  BP: 87/38 (19 Sep 2020 05:31) (87/38 - 134/59)  BP(mean): 80 (18 Sep 2020 10:37) (80 - 80)  RR: 15 (19 Sep 2020 05:31) (15 - 24)  SpO2: 92% (19 Sep 2020 05:31) (92% - 100%)      20 @ 07:01  -  20 @ 07:00  --------------------------------------------------------  IN: 62 mL / OUT: 100 mL / NET: -38 mL            LABS:                        9.4    6.35  )-----------( 101      ( 18 Sep 2020 08:24 )             29.5         132<L>  |  99  |  43<H>  ----------------------------<  92  4.6   |  28  |  0.82    Ca    8.8      18 Sep 2020 08:24  Phos  3.4         TPro  7.4  /  Alb  1.2<L>  /  TBili  0.4  /  DBili  x   /  AST  19  /  ALT  12  /  AlkPhos  84      PT/INR - ( 18 Sep 2020 08:24 )   PT: 13.3 sec;   INR: 1.15 ratio         PTT - ( 17 Sep 2020 10:16 )  PTT:43.5 sec  Urinalysis Basic - ( 18 Sep 2020 00:18 )    Color: Yellow / Appearance: Slightly Turbid / S.005 / pH: x  Gluc: x / Ketone: Negative  / Bili: Negative / Urobili: 1 mg/dL   Blood: x / Protein: 100 mg/dL / Nitrite: Negative   Leuk Esterase: Moderate / RBC: 0-2 /HPF / WBC 11-25   Sq Epi: x / Non Sq Epi: Occasional / Bacteria: TNTC        ABG - ( 17 Sep 2020 11:10 )  pH, Arterial: x     pH, Blood: 7.32  /  pCO2: 50    /  pO2: 111   / HCO3: 25    / Base Excess: -0.8  /  SaO2: 98                  WBC:  WBC Count: 6.35 K/uL ( @ 08:24)  WBC Count: 6.95 K/uL ( @ 10:16)      MICROBIOLOGY:  RECENT CULTURES:   .Blood Blood-Peripheral XXXX XXXX   No growth to date.          CARDIAC MARKERS ( 18 Sep 2020 08:24 )  .020 ng/mL / x     / x     / x     / x      CARDIAC MARKERS ( 17 Sep 2020 10:16 )  <.015 ng/mL / x     / x     / x     / x            PT/INR - ( 18 Sep 2020 08:24 )   PT: 13.3 sec;   INR: 1.15 ratio         PTT - ( 17 Sep 2020 10:16 )  PTT:43.5 sec    Sodium:  Sodium, Serum: 132 mmol/L ( @ 08:24)  Sodium, Serum: 134 mmol/L ( 10:16)      0.82 mg/dL  08:24  0.41 mg/dL  10:16      Hemoglobin:  Hemoglobin: 9.4 g/dL ( @ 08:24)  Hemoglobin: 10.4 g/dL ( @ 10:16)      Platelets: Platelet Count - Automated: 101 K/uL ( @ 08:24)  Platelet Count - Automated: 103 K/uL ( @ 10:16)      LIVER FUNCTIONS - ( 18 Sep 2020 08:24 )  Alb: 1.2 g/dL / Pro: 7.4 gm/dL / ALK PHOS: 84 U/L / ALT: 12 U/L / AST: 19 U/L / GGT: x             Urinalysis Basic - ( 18 Sep 2020 00:18 )    Color: Yellow / Appearance: Slightly Turbid / S.005 / pH: x  Gluc: x / Ketone: Negative  / Bili: Negative / Urobili: 1 mg/dL   Blood: x / Protein: 100 mg/dL / Nitrite: Negative   Leuk Esterase: Moderate / RBC: 0-2 /HPF / WBC 11-25   Sq Epi: x / Non Sq Epi: Occasional / Bacteria: TNTC        RADIOLOGY & ADDITIONAL STUDIES:

## 2020-09-19 NOTE — PROGRESS NOTE ADULT - SUBJECTIVE AND OBJECTIVE BOX
HPI:  : 89 y/o F with pmhx HTN, DM, GERD, parkinson's presents BIBA for respiratory distress. Pt's head is down and has contracted extremities. DNR but no DNI. Pt in severe respiratory distress and cannot provide history.     (17 Sep 2020 14:19)      SUBJECTIVE & OBJECTIVE: Pt seen and examined at bedside.   no overnight events.     ros unable to examine due to [ ] Encephalopathy  [x ] Advanced Dementia  [ ] Expressive Aphasia  [ ] Non-verbal patient     PHYSICAL EXAM:  Vital Signs Last 24 Hrs  T(C): 36.1 (19 Sep 2020 16:57), Max: 36.1 (19 Sep 2020 16:57)  T(F): 97 (19 Sep 2020 16:57), Max: 97 (19 Sep 2020 16:57)  HR: 58 (19 Sep 2020 17:19) (50 - 59)  BP: 104/37 (19 Sep 2020 16:57) (84/41 - 118/49)  BP(mean): 61 (19 Sep 2020 16:57) (61 - 61)  RR: 18 (19 Sep 2020 16:57) (15 - 18)  SpO2: 92% (19 Sep 2020 17:19) (90% - 96%)        GENERAL: nonverbal, contracted , in respiratory distress   HEAD:  Atraumatic, Normocephalic  NECK: Supple, No JVD  CHEST/LUNG: decreased BS throughout the left lung   HEART: Regular rate and rhythm; No murmurs, rubs, or gallops  ABDOMEN: Soft, Nontender, Nondistended; Bowel sounds present  EXTREMITIES:  No clubbing, cyanosis, or edema b/l     MEDICATIONS  (STANDING):  albuterol/ipratropium for Nebulization 3 milliLiter(s) Nebulizer every 6 hours  influenza   Vaccine 0.5 milliLiter(s) IntraMuscular once  latanoprost 0.005% Ophthalmic Solution 1 Drop(s) Both EYES at bedtime  morphine  Infusion 1 mG/Hr (1 mL/Hr) IV Continuous <Continuous>  pantoprazole  Injectable 40 milliGRAM(s) IV Push daily  piperacillin/tazobactam IVPB.. 3.375 Gram(s) IV Intermittent every 8 hours  scopolamine 1 mG/72 Hr(s) Patch 1 Patch Transdermal every 72 hours  valproate sodium IVPB 250 milliGRAM(s) IV Intermittent two times a day    MEDICATIONS  (PRN):  morphine  - Injectable 0.5 milliGRAM(s) IV Push every 1 hour PRN if RR >25, and/or air hunger      LABS:                        9.4    6.35  )-----------( 101      ( 18 Sep 2020 08:24 )             29.5       132<L>  |  99  |  43<H>  ----------------------------<  92  4.6   |  28  |  0.82    Ca    8.8      18 Sep 2020 08:24  Phos  3.4         TPro  7.4  /  Alb  1.2<L>  /  TBili  0.4  /  DBili  x   /  AST  19  /  ALT  12  /  AlkPhos  84      Urinalysis Basic - ( 18 Sep 2020 00:18 )    Color: Yellow / Appearance: Slightly Turbid / S.005 / pH: x  Gluc: x / Ketone: Negative  / Bili: Negative / Urobili: 1 mg/dL   Blood: x / Protein: 100 mg/dL / Nitrite: Negative   Leuk Esterase: Moderate / RBC: 0-2 /HPF / WBC 11-25   Sq Epi: x / Non Sq Epi: Occasional / Bacteria: TNTC        ABG - ( 17 Sep 2020 11:10 )  pH, Arterial: x     pH, Blood: 7.32  /  pCO2: 50    /  pO2: 111   / HCO3: 25    / Base Excess: -0.8  /  SaO2: 98                CARDIAC MARKERS ( 18 Sep 2020 08:24 )  .020 ng/mL / x     / x     / x     / x      CARDIAC MARKERS ( 17 Sep 2020 10:16 )  <.015 ng/mL / x     / x     / x     / x            RECENT CULTURES:  Urine culture:   @ 15:05 --   No growth to date.      RADIOLOGY & ADDITIONAL TESTS:  Imaging Personally Reviewed:  [ ] YES  [ ] NO    Consultant(s) Notes Reviewed:  [x ] YES  [ ] NO    Care Discussed with Consultants/Other Providers [x ] YES  [ ] NO    Care discussed in detail with patient's son Harish .  All questions and concerns addressed

## 2020-09-19 NOTE — PROGRESS NOTE ADULT - PROBLEM SELECTOR PLAN 7
DNR/DNI  palliative following, patient is a candidate for inpatient hospice   heparin SQ-dvt ppx  fall, aspiration precautions  poor prognosis

## 2020-09-20 NOTE — GOALS OF CARE CONVERSATION - ADVANCED CARE PLANNING - CONVERSATION DETAILS
Weekend staff at [facility] called to follow up with the pt's son yesterday and earlier today. V/M were left each time. As per [facility] staff pt's son has not returned any calls. Spoke with Dr. Magda CHIU twice today to discuss the case. She is aware that [facility] has made several attempts to reach the son to have consents signed and admit to hospice and no call from the son has been received as of yet. Will cont to f/u  in the am.       Aym Loyd RN

## 2020-09-20 NOTE — DISCHARGE NOTE PROVIDER - NSDCMRMEDTOKEN_GEN_ALL_CORE_FT
lisinopril 40 mg oral tablet: 1 tab(s) orally once a day  metoprolol tartrate 25 mg oral tablet: 1 tab(s) orally 2 times a day  omeprazole 20 mg oral delayed release capsule: 1 cap(s) orally once a day  Travatan Z 0.004% ophthalmic solution: 1 drop(s) to each affected eye once a day (at bedtime)  valproic acid 250 mg/5 mL oral liquid: 5 milliliter(s) orally 2 times a day

## 2020-09-20 NOTE — DISCHARGE NOTE PROVIDER - NSDCCPCAREPLAN_GEN_ALL_CORE_FT
PRINCIPAL DISCHARGE DIAGNOSIS  Diagnosis: Pneumonia  Assessment and Plan of Treatment: left lobar PNA      SECONDARY DISCHARGE DIAGNOSES  Diagnosis: Pressure injury of sacral region, stage 4  Assessment and Plan of Treatment: Pressure injury of sacral region, stage 4    Diagnosis: Encounter for palliative care  Assessment and Plan of Treatment: Encounter for palliative care    Diagnosis: Functional quadriplegia  Assessment and Plan of Treatment: Functional quadriplegia    Diagnosis: Acute cystitis without hematuria  Assessment and Plan of Treatment: Acute cystitis without hematuria

## 2020-09-20 NOTE — DISCHARGE NOTE PROVIDER - HOSPITAL COURSE
89 female with parkingsons with acute respitratory failure and sepsis requiring barehugger - rejected by icu - patient is dnr dni     patient accepted to inpatient hospice.   comfort care measures   family OK with abx and other supportive measures   discussed with family , family requests comfort measures   d/c to hospice     DISCHARGE DIAGNOSES:     Problem/Plan - 1:  ·  Problem: Lobar pneumonia.  Plan: CXR: left lobe PNA   procal elevated   continue with zosyn   blood Cx no growth.     Problem/Plan - 2:  ·  Problem: Acute respiratory failure with hypoxia and hypercapnia.  Plan: DNI/DNR  BIPAP contraindicated, patient has poor mentation/ advanced dementia, nonverbal , does not open her eyes, does not follow commands  ++contracted  supplemental oxygen via NC   morphine drip started for respiratory distress and air hunger  scopalamine patch for copious secretions , frequent suctioning  d/c lasix today     Problem/Plan - 3:  ·  Problem: Acute cystitis without hematuria.  Plan: urine Cx Culture Results: Culture Results:   >100,000 CFU/ml Klebsiella pneumoniae (09.18.20 @ 11:05)  on zosyn.     Problem/Plan - 4:  ·  Problem: Pressure injury of sacral region, stage 4.  Plan: Vascular consult for possible debridement, however patient not a good candidate for debridement at this time. patient awaiting hospice.    Problem/Plan - 5:  ·  Problem: Functional quadriplegia.  Plan: supportive care.     Problem/Plan - 6:  Problem: Parkinsons disease. Plan: dementia   supportive care.    Discharge time : 40 min     RETURN PARAMETERS DISCUSSED WITH PATIENT, PATIENT EXPRESSED UNDERSTANDING AND IS AGREEABLE. DISCUSSED WITH PATIENT ON REFRAINING FROM DRIVING UNTIL FOLLOW-UP/ CLEARED BY PMD. PATIENT EXPRESSED UNDERSTANDING.    89 female with parkingsons with acute respitratory failure and sepsis requiring barehugger - rejected by icu - patient is dnr dni     patient accepted to inpatient hospice.   comfort care measures   family OK with abx and other supportive measures   discussed with family , family requests comfort measures   d/c to hospice     GENERAL: nonverbal, contracted , apneic breathing   HEAD:  Atraumatic, Normocephalic  NECK: Supple, No JVD  CHEST/LUNG: decreased BS throughout the left lung   HEART: Regular rate and rhythm; No murmurs, rubs, or gallops  ABDOMEN: Soft, Nontender, Nondistended; Bowel sounds present  EXTREMITIES:  No clubbing, cyanosis, or edema b/l       DISCHARGE DIAGNOSES:     Problem/Plan - 1:  ·  Problem: Lobar pneumonia.  Plan: CXR: left lobe PNA   procal elevated   continue with zosyn   blood Cx no growth.     Problem/Plan - 2:  ·  Problem: Acute respiratory failure with hypoxia and hypercapnia.  Plan: DNI/DNR  BIPAP contraindicated, patient has poor mentation/ advanced dementia, nonverbal , does not open her eyes, does not follow commands  ++contracted  supplemental oxygen via NC   morphine drip started for respiratory distress and air hunger  scopalamine patch for copious secretions , frequent suctioning  d/c lasix today     Problem/Plan - 3:  ·  Problem: Acute cystitis without hematuria.  Plan: urine Cx Culture Results: Culture Results:   >100,000 CFU/ml Klebsiella pneumoniae (09.18.20 @ 11:05)  on zosyn.     Problem/Plan - 4:  ·  Problem: Pressure injury of sacral region, stage 4.  Plan: Vascular consult for possible debridement, however patient not a good candidate for debridement at this time. patient awaiting hospice.    Problem/Plan - 5:  ·  Problem: Functional quadriplegia.  Plan: supportive care.     Problem/Plan - 6:  Problem: Parkinsons disease. Plan: dementia   supportive care.    Discharge time : 40 min     RETURN PARAMETERS DISCUSSED WITH PATIENT, PATIENT EXPRESSED UNDERSTANDING AND IS AGREEABLE. DISCUSSED WITH PATIENT ON REFRAINING FROM DRIVING UNTIL FOLLOW-UP/ CLEARED BY PMD. PATIENT EXPRESSED UNDERSTANDING.

## 2020-09-20 NOTE — PROGRESS NOTE ADULT - ASSESSMENT
cont rx   cont rx    IRMA HERRMANN [phone]  7/15/1930 DOA 2020 DR RON TEJEDA     REVIEW OF SYMPTOMS      Able to give ROS  NO     PHYSICAL EXAM    HEENT Unremarkable PERRLA atraumatic   RESP Fair air entry EXP prolonged    Harsh breath sound Resp distres mild   CARDIAC S1 S2 No S3     NO JVD    ABDOMEN SOFT BS PRESENT NOT DISTENDED No hepatosplenomegaly PEDAL EDEMA present No calf tenderness  NO rash   GENERAL Not TOXIC looking      OXYGENATION        VITALS/LABS   2020 afeb 58  98/40           PATIENT SUMMARY.   89 y/o F with pmhx HTN, DM, GERD, parkinson's presents BIBA for respiratory distress  Pulmonary consulted 2020     PROBLEM/ASSESSMENT/RECOMMENDATIONS.  DYSPNEA   Possibly sec asp pneu  HYPERCAPNIC RESP FAILURE   ABG 2020 50% 732/50/111   Pt seen by Palliative   Pt considered to be too risky for bpap use as he is lethargic   O2 to keep po 90-95%  COPD   duoneb  PNEUMONIA      2020 Pt had elevated pr (0.47 on 2020) l base infiltr His leg and mrsa were neg   So cont zosyn ()     COVID STATUS   2020 covid naive   [facility]   Hospice being planned   Will sign off case         TIME SPENT   Over 25 minutes aggregate care time spent on encounter; activities included   direct patient care, counseling and/or coordinating care reviewing notes, lab data/ imaging , discussion with multidisciplinary team/ patient  /family and explaining in detail risks, benefits, alternatives  of the recommendations     IRMA HERMRANN [phone]  7/15/1930 DOA 2020 DR RON TEJEDA

## 2020-09-20 NOTE — PROGRESS NOTE ADULT - SUBJECTIVE AND OBJECTIVE BOX
MONTEZ IRMA    [facility] [address]    Patient is a 89y old  Female who presents with a chief complaint of respiratory distress (20 Sep 2020 08:23)       Allergies    No Known Allergies    Intolerances        HPI:  : 89 y/o F with pmhx HTN, DM, GERD, parkinson's presents BIBA for respiratory distress. Pt's head is down and has contracted extremities. DNR but no DNI. Pt in severe respiratory distress and cannot provide history.     (17 Sep 2020 14:19)      PAST MEDICAL & SURGICAL HISTORY:  Parkinson disease    DM (diabetes mellitus)    HTN (hypertension)    Glaucoma        FAMILY HISTORY:        MEDICATIONS   albuterol/ipratropium for Nebulization 3 milliLiter(s) Nebulizer every 6 hours  dextrose 5%. 1000 milliLiter(s) IV Continuous <Continuous>  furosemide   Injectable 40 milliGRAM(s) IV Push daily  heparin   Injectable 5000 Unit(s) SubCutaneous every 12 hours  influenza   Vaccine 0.5 milliLiter(s) IntraMuscular once  latanoprost 0.005% Ophthalmic Solution 1 Drop(s) Both EYES at bedtime  morphine  - Injectable 0.5 milliGRAM(s) IV Push every 1 hour PRN  morphine  Infusion 1 mG/Hr IV Continuous <Continuous>  pantoprazole  Injectable 40 milliGRAM(s) IV Push daily  piperacillin/tazobactam IVPB.. 3.375 Gram(s) IV Intermittent every 8 hours  scopolamine 1 mG/72 Hr(s) Patch 1 Patch Transdermal every 72 hours  valproate sodium IVPB 250 milliGRAM(s) IV Intermittent two times a day      Vital Signs Last 24 Hrs  T(C): 36.1 (20 Sep 2020 03:30), Max: 36.1 (19 Sep 2020 16:57)  T(F): 97 (20 Sep 2020 03:30), Max: 97 (19 Sep 2020 16:57)  HR: 55 (20 Sep 2020 05:50) (50 - 59)  BP: 88/34 (20 Sep 2020 05:50) (84/34 - 104/37)  BP(mean): 61 (19 Sep 2020 16:57) (61 - 61)  RR: 18 (20 Sep 2020 05:50) (15 - 18)  SpO2: 100% (20 Sep 2020 05:50) (90% - 100%)            LABS:                    WBC:  WBC Count: 6.35 K/uL (09-18 @ 08:24)  WBC Count: 6.95 K/uL (09-17 @ 10:16)      MICROBIOLOGY:  RECENT CULTURES:  09-18 .Urine Clean Catch (Midstream) XXXX XXXX   >100,000 CFU/ml Klebsiella pneumoniae    09-17 .Blood Blood-Peripheral XXXX XXXX   No growth to date.                    Sodium:  Sodium, Serum: 132 mmol/L (09-18 @ 08:24)  Sodium, Serum: 134 mmol/L (09-17 @ 10:16)      0.82 mg/dL 09-18 @ 08:24  0.41 mg/dL 09-17 @ 10:16      Hemoglobin:  Hemoglobin: 9.4 g/dL (09-18 @ 08:24)  Hemoglobin: 10.4 g/dL (09-17 @ 10:16)      Platelets: Platelet Count - Automated: 101 K/uL (09-18 @ 08:24)  Platelet Count - Automated: 103 K/uL (09-17 @ 10:16)              RADIOLOGY & ADDITIONAL STUDIES:

## 2020-09-21 NOTE — DISCHARGE NOTE FOR THE EXPIRED PATIENT - HOSPITAL COURSE
89 y/o female with Parkinson's with acute respiratory failure and sepsis w worsening condition.  Palliative care team discussed w/ family, patient DNR/DNI, and in process for inpatient hospice.  This morning no spontaneous movements were present.  There was no response to verbal or tactile stimuli.  Pupils were fixed and dilated.  No breath sounds were appreciated over the lung fields.  No carotid pulses were palpable.  No heart sounds were appreciated over entire precordium.  Patient pronounced dead at 9/21/2020 @ 938AM.  Family was notified Harish Ruiz.

## 2020-09-21 NOTE — CHART NOTE - NSCHARTNOTEFT_GEN_A_CORE
Death Note:    I was called to patient's bedside to pronounce that patient has .  I confirmed DNR/DNI is signed in chart.  No spontaneous movements were present.  There was no response to verbal or tactile stimuli.  Pupils were fixed and dilated.  No breath sounds were appreciated over the lung fields.  No carotid pulses were palpable.  No heart sounds were appreciated over entire precordium.  Patient pronounced dead at 2020 @ 938AM.  Family was notified Harish Ruiz.

## 2020-09-22 LAB
CULTURE RESULTS: SIGNIFICANT CHANGE UP
CULTURE RESULTS: SIGNIFICANT CHANGE UP
SPECIMEN SOURCE: SIGNIFICANT CHANGE UP
SPECIMEN SOURCE: SIGNIFICANT CHANGE UP

## 2020-10-01 PROBLEM — K11.7 EXCESSIVE SALIVATION: Status: ACTIVE | Noted: 2019-01-30

## 2021-10-06 PROBLEM — I10 ESSENTIAL HYPERTENSION: Status: ACTIVE | Noted: 2017-05-19

## 2022-08-18 NOTE — CHRONIC CARE ASSESSMENT
[PPS Score: ____] : Palliative Performance Scale (PPS) Score: [unfilled] [FAST Score: ____] : Functional Assessment Scale (FAST) Score: [unfilled] Son

## 2024-04-11 NOTE — ED PROVIDER NOTE - SCRIBE NAME
HCC coding opportunities       Chart reviewed, no opportunity found: CHART REVIEWED, NO OPPORTUNITY FOUND   [facility] Reviewed      Patients Insurance     Medicare Insurance: [facility] Medicare Advantage          
Jhon Bernal
